# Patient Record
Sex: MALE | Race: WHITE | NOT HISPANIC OR LATINO | Employment: OTHER | URBAN - METROPOLITAN AREA
[De-identification: names, ages, dates, MRNs, and addresses within clinical notes are randomized per-mention and may not be internally consistent; named-entity substitution may affect disease eponyms.]

---

## 2017-01-05 ENCOUNTER — THERAPY VISIT (OUTPATIENT)
Dept: PHYSICAL THERAPY | Facility: CLINIC | Age: 67
End: 2017-01-05

## 2017-01-05 DIAGNOSIS — M25.511 RIGHT SHOULDER PAIN, UNSPECIFIED CHRONICITY: Primary | ICD-10-CM

## 2017-01-05 NOTE — PROGRESS NOTES
Subjective:    HPI                    Objective:    System    Physical Exam    General     ROS    Assessment/Plan:      PROGRESS  REPORT    Progress reporting period is from 12/8/16 to 1/5/17.       SUBJECTIVE  Subjective changes noted by patient:  Pt reports he feels his shoulder continues to improve but it still hurts when he lays on it at night. Still can't sleep on it. After he does his exercises in the morning, then it's OK but if he wears his bibs in the shop, he's sore at the end of the day.  At this point in the visit, I told the patient I wanted to measure his motion and check his strength and he got really upset and left. I did try talking to the patient but he did not want to discuss and left so no measurements or treatment were done.  Current Pain level: 0/10 (right now, 3/10).     Previous pain level was  7/10  .   Changes in function:  Yes (See Goal flowsheet attached for changes in current functional level)  Adverse reaction to treatment or activity: None    OBJECTIVE  Changes noted in objective findings:  Yes, unknown as patient left before measurements could be taken        ASSESSMENT/PLAN  Updated problem list and treatment plan: Diagnosis 1:  Shoulder pain  Pain -  hot/cold therapy, manual therapy, self management, education and home program  Decreased ROM/flexibility - manual therapy, therapeutic exercise, therapeutic activity and home program  Decreased strength - therapeutic exercise, therapeutic activities and home program  Impaired muscle performance - neuro re-education and home program  Decreased function - therapeutic activities and home program  STG/LTGs have been met or progress has been made towards goals:  Yes (See Goal flow sheet completed today.)  Assessment of Progress: Appears patient is improving  Self Management Plans:  Patient has been instructed in a home treatment program.  Patient  has been instructed in self management of symptoms.    John continues to require the  following intervention to meet STG and LTG's:  PT intervention is no longer required to meet STG/LTG.    Recommendations:  D/C as patient left clinic for unknown reasons.    Please refer to the daily flowsheet for treatment today, total treatment time and time spent performing 1:1 timed codes.

## 2017-02-04 ENCOUNTER — HOSPITAL ENCOUNTER (EMERGENCY)
Facility: CLINIC | Age: 67
Discharge: HOME | End: 2017-02-04
Attending: EMERGENCY MEDICINE | Admitting: EMERGENCY MEDICINE
Payer: COMMERCIAL

## 2017-02-04 VITALS
DIASTOLIC BLOOD PRESSURE: 98 MMHG | TEMPERATURE: 98 F | OXYGEN SATURATION: 98 % | SYSTOLIC BLOOD PRESSURE: 161 MMHG | BODY MASS INDEX: 28.12 KG/M2 | HEART RATE: 91 BPM | RESPIRATION RATE: 16 BRPM | WEIGHT: 225 LBS

## 2017-02-04 DIAGNOSIS — S61.411A LACERATION OF RIGHT HAND, INITIAL ENCOUNTER: ICD-10-CM

## 2017-02-04 PROCEDURE — 12004 RPR S/N/AX/GEN/TRK7.6-12.5CM: CPT

## 2017-02-04 PROCEDURE — 99283 EMERGENCY DEPT VISIT LOW MDM: CPT | Mod: 25 | Performed by: EMERGENCY MEDICINE

## 2017-02-04 PROCEDURE — 12004 RPR S/N/AX/GEN/TRK7.6-12.5CM: CPT | Performed by: EMERGENCY MEDICINE

## 2017-02-04 PROCEDURE — 99284 EMERGENCY DEPT VISIT MOD MDM: CPT | Mod: 25

## 2017-02-04 RX ORDER — LIDOCAINE HYDROCHLORIDE AND EPINEPHRINE 10; 10 MG/ML; UG/ML
INJECTION, SOLUTION INFILTRATION; PERINEURAL
Status: DISCONTINUED
Start: 2017-02-04 | End: 2017-02-04 | Stop reason: HOSPADM

## 2017-02-04 RX ORDER — HYDROCODONE BITARTRATE AND ACETAMINOPHEN 5; 325 MG/1; MG/1
1-2 TABLET ORAL EVERY 4 HOURS PRN
Qty: 15 TABLET | Refills: 0 | Status: SHIPPED | OUTPATIENT
Start: 2017-02-04 | End: 2017-02-13

## 2017-02-04 ASSESSMENT — ENCOUNTER SYMPTOMS: WOUND: 1

## 2017-02-04 NOTE — ED PROVIDER NOTES
History     Chief Complaint   Patient presents with     Laceration     to left hand     HPI  John Younger is a 67 year old male who presents with a laceration to the dorsum of his right hand. Just prior to arrival the patient was using a large grinding wheel on a small . The patient reports the grinding wheel shattered and subsequently cut the top of his hand. The bleeding is currently controlled. His tetanus is up to date. There is no additional history to report.  Patient is on aspirin otherwise no antiplatelet therapy.    Past Medical History   Diagnosis Date     Major depressive disorder, single episode, moderate (H) 2005     says he has problems since 12 yrs back.     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     Contact dermatitis and other eczema, due to unspecified cause 2005     Patient Active Problem List   Diagnosis     Hyperlipidemia LDL goal <130     Elevated hemoglobin A1c     Irregular heart beat     Overweight (BMI 25.0-29.9)     24 hour handout given     Generalized anxiety disorder     Right shoulder pain, unspecified chronicity     Current Facility-Administered Medications   Medication     lidocaine 1% with EPINEPHrine 1:100,000 1 %-1:460493 injection     Current Outpatient Prescriptions   Medication     HYDROcodone-acetaminophen (NORCO) 5-325 MG per tablet     QUEtiapine (SEROQUEL) 100 MG tablet     venlafaxine (EFFEXOR-XR) 37.5 MG 24 hr capsule     atorvastatin (LIPITOR) 40 MG tablet     Cholecalciferol (VITAMIN D3) 2000 UNITS CAPS     MULTIVITAMINS OR      No Known Allergies  Social History     Social History     Marital Status:      Spouse Name: N/A     Number of Children: N/A     Years of Education: N/A     Occupational History     Not on file.     Social History Main Topics     Smoking status: Former Smoker     Quit date: 07/15/1983      Smokeless tobacco: Not on file      Comment: started smoking at age 13 got up to 3 packs a day before quiting     Alcohol Use: Yes      Comment: maybe 6 beers a month at the most     Drug Use: No     Sexual Activity:     Partners: Female     Other Topics Concern     Not on file     Social History Narrative     Family History   Problem Relation Age of Onset     CANCER Mother      uterus     Hypertension Mother      Alzheimer Disease Father      Hypertension Sister      Hypertension Sister      Gynecology Sister      hysterectomy     Gynecology Sister      hysterectomy     Depression Brother      Hypertension Brother      C.A.D. Father      bypass surgery, dx at age 60       I have reviewed the Medications, Allergies, Past Medical and Surgical History, and Social History in the Epic system.    Review of Systems   Skin: Positive for wound (laceration to dorsum of right hand).    all other systems are reviewed and are negative.    Physical Exam   BP: (!) 161/98 mmHg  Pulse: 91  Temp: 98  F (36.7  C)  Resp: 16  Weight: 102.059 kg (225 lb)  SpO2: 98 %  Physical Exam Gen. alert cooperative male in moderate stress.  Examination of his right dominant hand reveals a 8.0 cm skin flap on the dorsum.  It's just proximal to the MP joints of the 3rd and 4th digits.  It is a full thickness skin flap.  There is no tendon involvement and the joints are not involved.  Patient also had a superficial skin tear at the MP of the index finger which did not involve the joint.  Also another smaller superficial lesion at the MP of the middle finger.  It's also does not involve the joint.  CMS was intact distally.  No other lesions are noted.    ED Course   Procedures       patient had a field block of lidocaine with epinephrine for anesthetic.  It was then irrigated.  It was explored no foreign body was noted.  Wound is then closed with 15 4-0 Ethilon sutures in interrupted fashion.  Antibiotic and compressive dressing were applied.        Critical Care time:  none               Labs Ordered and Resulted from Time of ED Arrival Up to the Time of Departure from the ED - No data to display    No results found for this or any previous visit (from the past 24 hour(s)).    Medications   lidocaine 1% with EPINEPHrine 1:100,000 1 %-1:944812 injection (not administered)       12:38 PM Patient Assessed       Assessments & Plan (with Medical Decision Making)   67-year-old male presents with laceration to his right dominant hand.  He has a large full-thickness skin flap on the dorsum of his right hand.  Smaller superficial lacerations that did not require suturing on the index and middle finger.  No involvement of the tendon or joints.  CMS is intact distally.  Injury was repaired as above.  No foreign body was noted.  Information laceration care is provided.  Reasons to return for reassessment were discussed.  I have reviewed the nursing notes.    I have reviewed the findings, diagnosis, plan and need for follow up with the patient.    New Prescriptions    HYDROCODONE-ACETAMINOPHEN (NORCO) 5-325 MG PER TABLET    Take 1-2 tablets by mouth every 4 hours as needed for moderate to severe pain       Final diagnoses:   Laceration of right hand, initial encounter     This document serves as a record of the services and decisions personally performed and made by Jourdan Prakash MD. It was created on HIS/HER behalf by Johny Lane, a trained medical scribe. The creation of this document is based the provider's statements to the medical scribe.  Johny Lane 12:39 PM 2/4/2017    Provider:   The information in this document, created by the medical scribe for me, accurately reflects the services I personally performed and the decisions made by me. I have reviewed and approved this document for accuracy prior to leaving the patient care area.  Jourdan Praksah MD 12:39 PM 2/4/2017 2/4/2017   [facility] EMERGENCY DEPARTMENT      Jourdan Prakash MD  02/04/17 [id number]

## 2017-02-04 NOTE — DISCHARGE INSTRUCTIONS
Hand Laceration: All Closures  A laceration is a cut through the skin. You have a cut on the hand. Deep cuts usually require stitches (sutures) or staples. Minor cuts may be closed with surgical tape or skin adhesive.   X-rays may be done if something may have entered the skin through the cut. Your may also be given a tetanus shot. This may be given if you are not updated on this vaccination and the object that cut you may carry tetanus.    Home care    Your healthcare provider may prescribe an antibiotic. This is to help prevent infection. Follow all instructions for taking this medicine. Take the medicine every day until it is gone or you are told to stop. You should not have any left over.    The healthcare provider may prescribe medicines for pain. Follow instructions for taking them.    Follow the healthcare provider s instructions on how to care for the cut.    Keep the wound clean and dry. Do not get the wound wet until you are told it is okay to do so. If the bandage gets wet, remove it. Gently pat the wound dry with a clean cloth. Then put on a clean, dry bandage..    To help prevent infection, wash your hands with soap and water before and after caring for the wound.     Caring for sutures or staples: Once you no longer need to keep them dry, clean the wound daily. First, remove the bandage. Then wash the area gently with soap and warm water, or as directed by the health care provider. Use a wet cotton swab to loosen and remove any blood or crust that forms. After cleaning, apply a thin layer of antibiotic ointment if advised. Then put on a new bandage unless you are told not to.    Caring for skin glue: Don t put apply liquid, ointment, or cream on the wound while the glue is in place. Avoid activities that cause heavy sweating. Protect the wound from sunlight. Do not scratch, rub, or pick at the adhesive film. Do not place tape directly over the film. The glue should peel off within 5 to 10  days.     Caring for surgical tape: Keep the area dry. If it gets wet, blot it dry with a clean towel. Surgical tape usually falls off within 7 to 10 days. If it has not fallen off after 10 days, you can take it off yourself. Put mineral oil or petroleum jelly on a cotton ball and gently rub the tape until it is removed.    Once you can get the wound wet, you may shower as usual but do not soak the wound in water (no tub baths or swimming)    Even with proper treatment, a wound infection may sometimes occur. Check the wound daily for signs of infection listed below.  Follow-up care  Follow up with your healthcare provider as advised. If you have stitches or staples, be sure to return as directed to have them removed.  When to seek medical advice  Call your healthcare provider right away if any of these occur:    Wound bleeding not controlled by direct pressure    Signs of infection, including increasing pain in the wound, increasing wound redness or swelling, or pus or bad odor coming from the wound    Fever of 100.4 F (38. C) or as directed by your health care provider    Stitches or staples come apart or fall out or surgical tape falls off before 7 days    Wound edges re-open    Wound changes colors    Numbness or weakness in the affected hand     Decreased movement of the hand    8656-8388 [employer]. [address] [place] [address], [place] [address]. All rights reserved. This information is not intended as a substitute for professional medical care. Always follow your healthcare professional's instructions.

## 2017-02-04 NOTE — ED AVS SNAPSHOT
[facility] Emergency Department    [address] [place] [address]    Phone:  [phone]    Fax:  [phone]                                       John Younger   MRN: [id number]    Department:  [facility] Emergency Department   Date of Visit:  2/4/2017           Patient Information     Date Of Birth          1950        Your diagnoses for this visit were:     Laceration of right hand, initial encounter        You were seen by Jourdan Prakash MD.      Follow-up Information     Follow up with Olesya Krishnan MD.    Specialty:  Family Practice    Why:  For suture removal in 10 days    Contact information:    [facility]  [id number]   [place] [address]  [phone]          Discharge Instructions         Hand Laceration: All Closures  A laceration is a cut through the skin. You have a cut on the hand. Deep cuts usually require stitches (sutures) or staples. Minor cuts may be closed with surgical tape or skin adhesive.   X-rays may be done if something may have entered the skin through the cut. Your may also be given a tetanus shot. This may be given if you are not updated on this vaccination and the object that cut you may carry tetanus.    Home care    Your healthcare provider may prescribe an antibiotic. This is to help prevent infection. Follow all instructions for taking this medicine. Take the medicine every day until it is gone or you are told to stop. You should not have any left over.    The healthcare provider may prescribe medicines for pain. Follow instructions for taking them.    Follow the healthcare provider s instructions on how to care for the cut.    Keep the wound clean and dry. Do not get the wound wet until you are told it is okay to do so. If the bandage gets wet, remove it. Gently pat the wound dry with a clean cloth. Then put on a clean, dry bandage..    To help prevent infection, wash your hands with soap and water before and after caring for  the wound.     Caring for sutures or staples: Once you no longer need to keep them dry, clean the wound daily. First, remove the bandage. Then wash the area gently with soap and warm water, or as directed by the health care provider. Use a wet cotton swab to loosen and remove any blood or crust that forms. After cleaning, apply a thin layer of antibiotic ointment if advised. Then put on a new bandage unless you are told not to.    Caring for skin glue: Don t put apply liquid, ointment, or cream on the wound while the glue is in place. Avoid activities that cause heavy sweating. Protect the wound from sunlight. Do not scratch, rub, or pick at the adhesive film. Do not place tape directly over the film. The glue should peel off within 5 to 10 days.     Caring for surgical tape: Keep the area dry. If it gets wet, blot it dry with a clean towel. Surgical tape usually falls off within 7 to 10 days. If it has not fallen off after 10 days, you can take it off yourself. Put mineral oil or petroleum jelly on a cotton ball and gently rub the tape until it is removed.    Once you can get the wound wet, you may shower as usual but do not soak the wound in water (no tub baths or swimming)    Even with proper treatment, a wound infection may sometimes occur. Check the wound daily for signs of infection listed below.  Follow-up care  Follow up with your healthcare provider as advised. If you have stitches or staples, be sure to return as directed to have them removed.  When to seek medical advice  Call your healthcare provider right away if any of these occur:    Wound bleeding not controlled by direct pressure    Signs of infection, including increasing pain in the wound, increasing wound redness or swelling, or pus or bad odor coming from the wound    Fever of 100.4 F (38. C) or as directed by your health care provider    Stitches or staples come apart or fall out or surgical tape falls off before 7 days    Wound edges  re-open    Wound changes colors    Numbness or weakness in the affected hand     Decreased movement of the hand    4655-2011 [employer]. [address] [place] [address], [place] [address]. All rights reserved. This information is not intended as a substitute for professional medical care. Always follow your healthcare professional's instructions.          24 Hour Appointment Hotline       To make an appointment at any [facility], call [phone] ([phone]). If you don't have a family doctor or clinic, we will help you find one. [facility] are conveniently located to serve the needs of you and your family.             Review of your medicines      START taking        Dose / Directions Last dose taken    HYDROcodone-acetaminophen 5-325 MG per tablet   Commonly known as:  NORCO   Dose:  1-2 tablet   Quantity:  15 tablet        Take 1-2 tablets by mouth every 4 hours as needed for moderate to severe pain   Refills:  0          Our records show that you are taking the medicines listed below. If these are incorrect, please call your family doctor or clinic.        Dose / Directions Last dose taken    atorvastatin 40 MG tablet   Commonly known as:  LIPITOR   Dose:  40 mg   Quantity:  90 tablet        Take 1 tablet (40 mg) by mouth daily For cholesterol.   Refills:  3        MULTIVITAMINS PO        1 po qd   Refills:  0        QUEtiapine 100 MG tablet   Commonly known as:  SEROQUEL   Quantity:  90 tablet        1 tablet nightly.   Refills:  1        venlafaxine 37.5 MG 24 hr capsule   Commonly known as:  EFFEXOR-XR   Dose:  37.5 mg   Quantity:  90 capsule        Take 1 capsule (37.5 mg) by mouth daily   Refills:  1        vitamin D3 2000 UNITS Caps        Take  by mouth daily.   Refills:  0                Prescriptions were sent or printed at these locations (1 Prescription)                   Other Prescriptions                Printed at Department/Unit printer (1 of 1)          "HYDROcodone-acetaminophen (NORCO) 5-325 MG per tablet                Orders Needing Specimen Collection     None      Pending Results     No orders found from 2/3/2017 to 2017.            Pending Culture Results     No orders found from 2/3/2017 to 2017.             Test Results from your hospital stay            Thank you for choosing [facility]       Thank you for choosing [facility] for your care. Our goal is always to provide you with excellent care. Hearing back from our patients is one way we can continue to improve our services. Please take a few minutes to complete the written survey that you may receive in the mail after you visit with us. Thank you!        [employer] Information     [employer]reader lets you send messages to your doctor, view your test results, renew your prescriptions, schedule appointments and more. To sign up, go to [email or web address] . Click on \"Log in\" on the left side of the screen, which will take you to the Welcome page. Then click on \"Sign up Now\" on the right side of the page.     You will be asked to enter the access code listed below, as well as some personal information. Please follow the directions to create your username and password.     Your access code is: [email or web address]  Expires: 2017  1:22 PM     Your access code will  in 90 days. If you need help or a new code, please call your [facility] clinic or [phone].        Care EveryWhere ID     This is your Care EveryWhere ID. This could be used by other organizations to access your [facility] medical records  [id number]        After Visit Summary       This is your record. Keep this with you and show to your community pharmacist(s) and doctor(s) at your next visit.                  "

## 2017-02-04 NOTE — ED AVS SNAPSHOT
[facility] Emergency Department    [address] [address]    Phone:  [phone]    Fax:  [phone]                                       John Younger   MRN: [id number]    Department:  [facility] Emergency Department   Date of Visit:  2/4/2017           After Visit Summary Signature Page     I have received my discharge instructions, and my questions have been answered. I have discussed any challenges I see with this plan with the nurse or doctor.    ..........................................................................................................................................  Patient/Patient Representative Signature      ..........................................................................................................................................  Patient Representative Print Name and Relationship to Patient    ..................................................               ................................................  Date                                            Time    ..........................................................................................................................................  Reviewed by Signature/Title    ...................................................              ..............................................  Date                                                            Time

## 2017-02-13 ENCOUNTER — OFFICE VISIT (OUTPATIENT)
Dept: FAMILY MEDICINE | Facility: CLINIC | Age: 67
End: 2017-02-13
Payer: COMMERCIAL

## 2017-02-13 VITALS
WEIGHT: 225 LBS | TEMPERATURE: 98.7 F | DIASTOLIC BLOOD PRESSURE: 86 MMHG | HEIGHT: 75 IN | HEART RATE: 76 BPM | SYSTOLIC BLOOD PRESSURE: 136 MMHG | BODY MASS INDEX: 27.98 KG/M2

## 2017-02-13 DIAGNOSIS — S61.401D: ICD-10-CM

## 2017-02-13 DIAGNOSIS — S61.411D HAND LACERATION, RIGHT, SUBSEQUENT ENCOUNTER: Primary | ICD-10-CM

## 2017-02-13 DIAGNOSIS — S66.901D: ICD-10-CM

## 2017-02-13 DIAGNOSIS — Z71.89 ADVANCED DIRECTIVES, COUNSELING/DISCUSSION: ICD-10-CM

## 2017-02-13 PROCEDURE — 99213 OFFICE O/P EST LOW 20 MIN: CPT | Performed by: FAMILY MEDICINE

## 2017-02-13 NOTE — PROGRESS NOTES
"  SUBJECTIVE:                                                    John Younger is a 67 year old male who presents to clinic today for the following health issues:      ED/UC Followup:    Facility:  [facility]  Date of visit: 2/4/2017  Reason for visit: Laceration of right hand  Current Status: 15 sutures placed in right hand. There are no signs of infection now but states that he was having some swelling. He was using Peroxide at home on wound. There is some slear drainage.          ROS:  C: NEGATIVE for fever, chills, change in weight  INTEGUMENTARY/SKIN: POSITIVE for laceration on dorsal aspect of right hand.   E/M: NEGATIVE for ear, mouth and throat problems  R: NEGATIVE for significant cough or SOB  CV: NEGATIVE for chest pain, palpitations or peripheral edema    This document serves as a record of the services and decisions personally performed and made by Olesya Krishnan MD. It was created on his behalf by Gio Howard, a trained medical scribe. The creation of this document is based the provider's statements to the medical scribe.  Gio Howard 8:43 AM February 13, 2017      OBJECTIVE:                                                    /86 (BP Location: Left arm, Cuff Size: Adult Large)  Pulse 76  Temp 98.7  F (37.1  C) (Tympanic)  Ht 6' 3\" (1.905 m)  Wt 225 lb (102.1 kg)  BMI 28.12 kg/m2  Body mass index is 28.12 kg/(m^2).     GENERAL: healthy, alert and no distress  EYES: Eyes grossly normal to inspection, conjunctivae and sclerae normal  MS: no gross musculoskeletal defects noted, no edema  SKIN: 4 cm, U-shaped laceration over the dorsum of the right hand. Relatively poor union of the wound edges. CMS intact but inability to completely extend middle fingers.   NEURO: Normal strength and tone, mentation intact and speech normal  PSYCH: mentation appears normal, affect normal/bright           ASSESSMENT/PLAN:                                                      (E43.757V) Hand laceration, right, " subsequent encounter  (primary encounter diagnosis)  Comment: Significant laceration, especially with the grinding blade. I reviewed the edges are not sharp with this type of injury, and the scar tends to be wide. Reviewed the laceration may open up over time.     Plan: Sutures removed, watch for any infection. Call with any concerns or questions.      (K08.401D,  H21.406A) Open wound of hand with tendon injury, right, subsequent encounter  Comment: It does appear to be a slight decrease in full extension involving the third finger, right hand, approximately 10 . Patient was comfortable with the function of his finger did not want to pursue further treatment.  Plan: Call with any concerns or questions.      (J65.26) Advanced directives, counseling/discussion            There are no Patient Instructions on file for this visit.      Patient will follow up if symptoms worsen or do not improve. Patient instructed to call with any questions or concerns.    The information in this document, created by a scribe for me, accurately reflects the services I personally performed and the decisions made by me. I have reviewed and approved this document for accuracy. 8:44 AM2/13/2017    Olesya Krishnan MD  [facility]

## 2017-02-13 NOTE — PROCEDURES
"  SUBJECTIVE:                                                    John Younger is a 67 year old male who presents to clinic today for the following health issues:      ED/UC Followup:    Facility:  [facility]  Date of visit: 2/4/2017  Reason for visit: laceration on right hand  Current Status: 15 sutures were placed. There are no signs of infection, redness, swelling or fevers.       {additional problems for provider to add:107569}    Problem list and histories reviewed & adjusted, as indicated.  Additional history: {NONE - AS DOCUMENTED:692195::\"as documented\"}    {HIST REVIEW/ LINKS 2:598347}    {PROVIDER CHARTING PREFERENCE:467564}    "

## 2017-02-13 NOTE — MR AVS SNAPSHOT
"              After Visit Summary   2017    John Younger    MRN: [id number]           Patient Information     Date Of Birth          1950        Visit Information        Provider Department      2017 8:20 AM Olesya Krishnan MD [facility]        Today's Diagnoses     Hand laceration, right, subsequent encounter    -  1    Advanced directives, counseling/discussion        Open wound of hand with tendon injury, right, subsequent encounter           Follow-ups after your visit        Who to contact     Normal or non-critical lab and imaging results will be communicated to you by Popps Appshart, letter or phone within 4 business days after the clinic has received the results. If you do not hear from us within 7 days, please contact the clinic through Popps Appshart or phone. If you have a critical or abnormal lab result, we will notify you by phone as soon as possible.  Submit refill requests through Maestrano or call your pharmacy and they will forward the refill request to us. Please allow 3 business days for your refill to be completed.          If you need to speak with a  for additional information , please call: [phone]           Additional Information About Your Visit        MyChart Information     Maestrano lets you send messages to your doctor, view your test results, renew your prescriptions, schedule appointments and more. To sign up, go to [email or web address] . Click on \"Log in\" on the left side of the screen, which will take you to the Welcome page. Then click on \"Sign up Now\" on the right side of the page.     You will be asked to enter the access code listed below, as well as some personal information. Please follow the directions to create your username and password.     Your access code is: [email or web address]  Expires: 2017  1:22 PM     Your access code will  in 90 days. If you need help or a new code, please call your [facility] clinic or [phone].   " "     Care EveryWhere ID     This is your Care EveryWhere ID. This could be used by other organizations to access your [facility] medical records  [id number]        Your Vitals Were     Pulse Temperature Height BMI (Body Mass Index)          76 98.7  F (37.1  C) (Tympanic) 6' 3\" (1.905 m) 28.12 kg/m2         Blood Pressure from Last 3 Encounters:   02/13/17 136/86   02/04/17 (!) 161/98   11/23/16 130/90    Weight from Last 3 Encounters:   02/13/17 225 lb (102.1 kg)   02/04/17 225 lb (102.1 kg)   11/23/16 204 lb (92.5 kg)              Today, you had the following     No orders found for display         Today's Medication Changes          These changes are accurate as of: 2/13/17 11:59 PM.  If you have any questions, ask your nurse or doctor.               Stop taking these medicines if you haven't already. Please contact your care team if you have questions.     HYDROcodone-acetaminophen 5-325 MG per tablet   Commonly known as:  NORCO   Stopped by:  Olesya Krishnan MD                    Primary Care Provider Office Phone # Fax #    Olesya Krishnan [phone]       [facility] [address]   [place] [address]        Thank you!     Thank you for choosing [facility]  for your care. Our goal is always to provide you with excellent care. Hearing back from our patients is one way we can continue to improve our services. Please take a few minutes to complete the written survey that you may receive in the mail after your visit with us. Thank you!             Your Updated Medication List - Protect others around you: Learn how to safely use, store and throw away your medicines at www.disposemymeds.org.          This list is accurate as of: 2/13/17 11:59 PM.  Always use your most recent med list.                   Brand Name Dispense Instructions for use    atorvastatin 40 MG tablet    LIPITOR    90 tablet    Take 1 tablet (40 mg) by mouth daily For cholesterol.       " MULTIVITAMINS PO      1 po qd       QUEtiapine 100 MG tablet    SEROQUEL    90 tablet    1 tablet nightly.       venlafaxine 37.5 MG 24 hr capsule    EFFEXOR-XR    90 capsule    Take 1 capsule (37.5 mg) by mouth daily       vitamin D3 2000 UNITS Caps      Take  by mouth daily.

## 2017-02-13 NOTE — NURSING NOTE
"Chief Complaint   Patient presents with     Suture Removal       Initial /86 (BP Location: Left arm, Cuff Size: Adult Large)  Pulse 76  Temp 98.7  F (37.1  C) (Tympanic)  Ht 6' 3\" (1.905 m)  Wt 225 lb (102.1 kg)  BMI 28.12 kg/m2 Estimated body mass index is 28.12 kg/(m^2) as calculated from the following:    Height as of this encounter: 6' 3\" (1.905 m).    Weight as of this encounter: 225 lb (102.1 kg).  Medication Reconciliation: complete    "

## 2017-03-28 ENCOUNTER — OFFICE VISIT (OUTPATIENT)
Dept: FAMILY MEDICINE | Facility: CLINIC | Age: 67
End: 2017-03-28
Payer: COMMERCIAL

## 2017-03-28 VITALS
SYSTOLIC BLOOD PRESSURE: 126 MMHG | HEIGHT: 75 IN | TEMPERATURE: 97 F | DIASTOLIC BLOOD PRESSURE: 84 MMHG | WEIGHT: 221.25 LBS | BODY MASS INDEX: 27.51 KG/M2 | HEART RATE: 74 BPM

## 2017-03-28 DIAGNOSIS — S61.411D HAND LACERATION, RIGHT, SUBSEQUENT ENCOUNTER: Primary | ICD-10-CM

## 2017-03-28 DIAGNOSIS — L03.113 CELLULITIS OF HAND, RIGHT: ICD-10-CM

## 2017-03-28 PROCEDURE — 99213 OFFICE O/P EST LOW 20 MIN: CPT | Performed by: FAMILY MEDICINE

## 2017-03-28 RX ORDER — CEPHALEXIN 500 MG/1
500 CAPSULE ORAL 3 TIMES DAILY
Qty: 30 CAPSULE | Refills: 0 | Status: SHIPPED | OUTPATIENT
Start: 2017-03-28 | End: 2018-02-02

## 2017-03-28 NOTE — MR AVS SNAPSHOT
After Visit Summary   3/28/2017    John Younger    MRN: [id number]           Patient Information     Date Of Birth          1950        Visit Information        Provider Department      3/28/2017 8:20 AM Olesya Krishnan MD [facility]        Today's Diagnoses     Hand laceration, right, subsequent encounter    -  1    Cellulitis of hand, right          Care Instructions    *   Could be infection, will treat with antibiotics.     *   Something does seem right, see the hand surgeon. Call [phone].         Follow-ups after your visit        Additional Services     ORTHO  REFERRAL       [facility] is referring you to the Orthopedic  Services at [facility].       The  Representative will assist you in the coordination of your Orthopedic and Musculoskeletal Care as prescribed by your physician.    The  Representative will call you within 1 business day to help schedule your appointment, or you may contact the  Representative at:    All areas ~ [phone]     Type of Referral : Surgical / Specialist hand surgeon.         Timeframe requested: 3 - 5 days    Coverage of these services is subject to the terms and limitations of your health insurance plan.  Please call member services at your health plan with any benefit or coverage questions.      If X-rays, CT or MRI's have been performed, please contact the facility where they were done to arrange for , prior to your scheduled appointment.  Please bring this referral request to your appointment and present it to your specialist.                  Who to contact     Normal or non-critical lab and imaging results will be communicated to you by MyChart, letter or phone within 4 business days after the clinic has received the results. If you do not hear from us within 7 days, please contact the clinic through MyChart or phone. If you have a  "critical or abnormal lab result, we will notify you by phone as soon as possible.  Submit refill requests through Qyuki or call your pharmacy and they will forward the refill request to us. Please allow 3 business days for your refill to be completed.          If you need to speak with a  for additional information , please call: [phone]           Additional Information About Your Visit        [employer] Information     Qyuki lets you send messages to your doctor, view your test results, renew your prescriptions, schedule appointments and more. To sign up, go to [email or web address] . Click on \"Log in\" on the left side of the screen, which will take you to the Welcome page. Then click on \"Sign up Now\" on the right side of the page.     You will be asked to enter the access code listed below, as well as some personal information. Please follow the directions to create your username and password.     Your access code is: [email or web address]  Expires: 2017  2:22 PM     Your access code will  in 90 days. If you need help or a new code, please call your [facility] clinic or [phone].        Care EveryWhere ID     This is your Care EveryWhere ID. This could be used by other organizations to access your [facility] medical records  [id number]        Your Vitals Were     Pulse Temperature Height BMI (Body Mass Index)          74 97  F (36.1  C) (Tympanic) 6' 3\" (1.905 m) 27.65 kg/m2         Blood Pressure from Last 3 Encounters:   17 126/84   17 136/86   17 (!) 161/98    Weight from Last 3 Encounters:   17 221 lb 4 oz (100.4 kg)   17 225 lb (102.1 kg)   17 225 lb (102.1 kg)              We Performed the Following     ORTHO  REFERRAL          Today's Medication Changes          These changes are accurate as of: 3/28/17  8:48 AM.  If you have any questions, ask your nurse or doctor.               Start taking these medicines.        Dose/Directions    " cephALEXin 500 MG capsule   Commonly known as:  KEFLEX   Used for:  Hand laceration, right, subsequent encounter   Started by:  Olesya Krishnan MD        Dose:  500 mg   Take 1 capsule (500 mg) by mouth 3 times daily For infection.   Quantity:  30 capsule   Refills:  0            Where to get your medicines      These medications were sent to [place] [address] [facility] - [address], [facility] [address]     Phone:  [phone]     cephALEXin 500 MG capsule                Primary Care Provider Office Phone # Fax #    Olesya Krishnan [phone]       [facility] [address]   [place] [address]        Thank you!     Thank you for choosing [facility]  for your care. Our goal is always to provide you with excellent care. Hearing back from our patients is one way we can continue to improve our services. Please take a few minutes to complete the written survey that you may receive in the mail after your visit with us. Thank you!             Your Updated Medication List - Protect others around you: Learn how to safely use, store and throw away your medicines at www.disposemymeds.org.          This list is accurate as of: 3/28/17  8:48 AM.  Always use your most recent med list.                   Brand Name Dispense Instructions for use    atorvastatin 40 MG tablet    LIPITOR    90 tablet    Take 1 tablet (40 mg) by mouth daily For cholesterol.       cephALEXin 500 MG capsule    KEFLEX    30 capsule    Take 1 capsule (500 mg) by mouth 3 times daily For infection.       MULTIVITAMINS PO      1 po qd       QUEtiapine 100 MG tablet    SEROQUEL    90 tablet    1 tablet nightly.       venlafaxine 37.5 MG 24 hr capsule    EFFEXOR-XR    90 capsule    Take 1 capsule (37.5 mg) by mouth daily       vitamin D3 2000 UNITS Caps      Take  by mouth daily.

## 2017-03-28 NOTE — NURSING NOTE
"Chief Complaint   Patient presents with     RECHECK       Initial /84  Pulse 74  Temp 97  F (36.1  C) (Tympanic)  Ht 6' 3\" (1.905 m)  Wt 221 lb 4 oz (100.4 kg)  BMI 27.65 kg/m2 Estimated body mass index is 27.65 kg/(m^2) as calculated from the following:    Height as of this encounter: 6' 3\" (1.905 m).    Weight as of this encounter: 221 lb 4 oz (100.4 kg).  Medication Reconciliation: complete      "
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
## 2017-03-28 NOTE — PATIENT INSTRUCTIONS
*   Could be infection, will treat with antibiotics.     *   Something does seem right, see the hand surgeon. Call [phone].

## 2017-04-21 ENCOUNTER — TELEPHONE (OUTPATIENT)
Dept: FAMILY MEDICINE | Facility: CLINIC | Age: 67
End: 2017-04-21

## 2017-04-21 NOTE — LETTER
[facility]  [address] [id number] [phone]      April 21, 2017      John Younger  [address] [address]        Dear John,     As part of [facility]'s commitment to health and wellness we have recently reviewed your chart and your medical record indicates that you are due for one or more of the following:    -- Colonoscopy or FIT test. The last colonoscopy/FIT that we have on file for you was from 11/13/14.    Please call one of the following numbers to schedule a colonoscopy:  [facility] [phone]  [facility] [phone]  [facility] [phone]  [facility] [phone] (multiple sites, call for locations)    A colonoscopy is the gold standard but if you are reluctant to do this there is a less invasive and less expensive alternative. FIT (fecal immunochemical testing) is a screening option that is performed on a yearly basis. This is a test to check for blood in the stool, which can be performed in the comfort of your own home. If you are willing to perform this test, we can order the kit for you to  at our lab. When you have completed the test, you simply mail it in the pre-addressed envelope.    Please call us at [phone] if you need an order for a colonoscopy or FIT testing.    Looking through your chart it looks like you are coming due for your next office visit to recheck medications some time in May.    While we work hard to maintain accurate records on all our patients, it is always possible that this notice does not accurately reflect tests that you may have had. To ensure that we do not continue to send you notices please verify, at your next visit or by a MightyQuiz message, that we have accurate dates of your tests, even if these were done many years ago.     Working together for your health is our goal. Thank you for choosing [facility] for your healthcare needs.    Sincerely,     Olesya  MD Eulogio/kp

## 2017-04-21 NOTE — TELEPHONE ENCOUNTER
Panel Management Review      Patient has the following on his problem list: None      Composite cancer screening  Chart review shows that this patient is due/due soon for the following Colonoscopy and Fecal Colorectal (FIT)  Summary:    Patient is due/failing the following:   COLONOSCOPY and FIT    Action needed:   Patient due for colonoscopy or FIT testing. I will let patient know he will be due for appointment to recheck medications in May    Type of outreach:    Sent letter.    Questions for provider review:    None                                                                                                                                    Awa Ayala CMA

## 2017-08-11 DIAGNOSIS — F41.1 GENERALIZED ANXIETY DISORDER: ICD-10-CM

## 2017-08-11 RX ORDER — QUETIAPINE FUMARATE 100 MG/1
TABLET, FILM COATED ORAL
Qty: 90 TABLET | Refills: 1 | Status: SHIPPED | OUTPATIENT
Start: 2017-08-11 | End: 2018-02-02

## 2017-08-11 NOTE — TELEPHONE ENCOUNTER
Quetiapine 100mg     Last Written Prescription Date: 11/23/2016 #90 x 1  Last filled 05/16/2017  Last Office Visit with [facility], [facility] or  Health prescribing provider: 03/28/2017 HOLLAND Krishnan       BP Readings from Last 3 Encounters:   03/28/17 126/84   02/13/17 136/86   02/04/17 (!) 161/98     Pulse Readings from Last 2 Encounters:   03/28/17 74   02/13/17 76     Lab Results   Component Value Date     01/05/2016     Lab Results   Component Value Date    WBC 5.9 01/05/2016     Lab Results   Component Value Date    RBC 4.95 01/05/2016     Lab Results   Component Value Date    HGB 15.1 01/05/2016     Lab Results   Component Value Date    HCT 45.0 01/05/2016     No components found for: MCT  Lab Results   Component Value Date    MCV 91 01/05/2016     Lab Results   Component Value Date    MCH 30.5 01/05/2016     Lab Results   Component Value Date    MCHC 33.6 01/05/2016     Lab Results   Component Value Date    RDW 13.4 01/05/2016     Lab Results   Component Value Date     01/05/2016     Lab Results   Component Value Date    CHOL 170 01/05/2016     Lab Results   Component Value Date    HDL 43 01/05/2016     Lab Results   Component Value Date     01/05/2016     Lab Results   Component Value Date    TRIG 116 01/05/2016     Lab Results   Component Value Date    CHOLHDLRATIO 4.4 01/08/2015

## 2018-01-31 NOTE — PROGRESS NOTES
"  SUBJECTIVE:   John Younger is a 68 year old male who presents to clinic today for the following health issues:      Hyperlipidemia Follow-Up  Atorvastatin 40mg qd    Rate your low fat/cholesterol diet?: good    Taking statin?  Yes, no muscle aches from statin    Other lipid medications/supplements?:  none    Anxiety Follow-Up  Seroquel 100mg qd    Status since last visit: Slight increase secondary to conflicts with neighbors, but overall tolerable. He discontinued Effexor because he did not think it was effective.    Other associated symptoms:None    Complicating factors:   Significant life event: No  Current substance abuse: None  Depression symptoms: No change    LEOLA-7 SCORE 1/5/2016 2/5/2016 11/23/2016   Total Score - - -   Total Score 2 2 0           Problem list, Medication list, Allergies, and Medical/Social/Surgical/Family histories reviewed in [facility] and updated as appropriate.    Labs reviewed in [facility]      ROS:  Constitutional, HEENT, cardiovascular, pulmonary, gi and gu systems are negative, except as otherwise noted.      This document serves as a record of the services and decisions personally performed and made by Olesya Krishnan MD. It was created on his behalf by Ami Liang, a trained medical scribe. The creation of this document is based the provider's statements to the medical scribe.  Ami Liang 9:17 AM February 2, 2018  OBJECTIVE:     /70  Pulse 64  Ht 6' 3\" (1.905 m)  Wt 221 lb (100.2 kg)  BMI 27.62 kg/m2  Body mass index is 27.62 kg/(m^2).     GENERAL: Healthy, alert and no distress  RESP: Lungs clear to auscultation - no rales, rhonchi or wheezes  CV: Regular rate and rhythm, normal S1 S2, no murmur  MS: No gross musculoskeletal defects noted, no edema  NEURO: Normal strength and tone, mentation intact and speech normal  PSYCH: Mentation appears normal, affect normal    Labs ordered today are pending    ASSESSMENT/PLAN:     (E78.5) Hyperlipidemia LDL goal <130  (primary encounter " diagnosis)  Comment: Primary prevention. Tolerating moderate statin well. Chronic stable problem.  Plan: Lipid panel reflex to direct LDL Fasting,         atorvastatin (LIPITOR) 40 MG tablet - Refilled.    (R73.09) Elevated glucose  Comment: Will check A1c today.  Plan: Hemoglobin A1c    (F41.1) Generalized anxiety disorder  Comment: Slight increase secondary to conflict with neighbors, but overall tolerable. He discontinued Effexor because he did not find it effective.  Plan: QUEtiapine (SEROQUEL) 100 MG tablet,         DISCONTINUED: venlafaxine (EFFEXOR-XR) 37.5 MG         24 hr capsule - Continue Seroquel as prescribed.        Patient will follow up in 6 months or sooner, PRN. Patient instructed to call with any questions or concerns.     total time spent with pt. was 25 minutes, 20 minutes of the visit was spent discussing the above issues, including pathophysiology, treatment options, and expected outcomes.     Patient Instructions   *   Sounds stressful.     *   Continue on the sleeping pill and the cholesterol medication.       The information in this document, created by a scribe for me, accurately reflects the services I personally performed and the decisions made by me. I have reviewed and approved this document for accuracy.  9:19 AM February 2, 2018    Olesya Krishnan MD  [facility]

## 2018-02-02 ENCOUNTER — OFFICE VISIT (OUTPATIENT)
Dept: FAMILY MEDICINE | Facility: CLINIC | Age: 68
End: 2018-02-02
Payer: COMMERCIAL

## 2018-02-02 VITALS
HEART RATE: 64 BPM | SYSTOLIC BLOOD PRESSURE: 124 MMHG | WEIGHT: 221 LBS | HEIGHT: 75 IN | DIASTOLIC BLOOD PRESSURE: 70 MMHG | BODY MASS INDEX: 27.48 KG/M2

## 2018-02-02 DIAGNOSIS — E78.5 HYPERLIPIDEMIA LDL GOAL <130: Primary | ICD-10-CM

## 2018-02-02 DIAGNOSIS — R73.09 ELEVATED GLUCOSE: ICD-10-CM

## 2018-02-02 DIAGNOSIS — F41.1 GENERALIZED ANXIETY DISORDER: ICD-10-CM

## 2018-02-02 PROCEDURE — 99214 OFFICE O/P EST MOD 30 MIN: CPT | Performed by: FAMILY MEDICINE

## 2018-02-02 RX ORDER — QUETIAPINE FUMARATE 100 MG/1
TABLET, FILM COATED ORAL
Qty: 90 TABLET | Refills: 1 | Status: SHIPPED | OUTPATIENT
Start: 2018-02-02 | End: 2018-08-01

## 2018-02-02 RX ORDER — VENLAFAXINE HYDROCHLORIDE 37.5 MG/1
37.5 CAPSULE, EXTENDED RELEASE ORAL DAILY
Qty: 90 CAPSULE | Refills: 1 | Status: SHIPPED | OUTPATIENT
Start: 2018-02-02 | End: 2018-02-02

## 2018-02-02 RX ORDER — ATORVASTATIN CALCIUM 40 MG/1
40 TABLET, FILM COATED ORAL DAILY
Qty: 90 TABLET | Refills: 3 | Status: SHIPPED | OUTPATIENT
Start: 2018-02-02 | End: 2019-08-09

## 2018-02-02 NOTE — NURSING NOTE
"Chief Complaint   Patient presents with     Anxiety       Initial /70  Pulse 64  Ht 6' 3\" (1.905 m)  Wt 221 lb (100.2 kg)  BMI 27.62 kg/m2 Estimated body mass index is 27.62 kg/(m^2) as calculated from the following:    Height as of this encounter: 6' 3\" (1.905 m).    Weight as of this encounter: 221 lb (100.2 kg).  Medication Reconciliation: complete  "

## 2018-02-02 NOTE — MR AVS SNAPSHOT
After Visit Summary   2/2/2018    John Younger    MRN: [id number]           Patient Information     Date Of Birth          1950        Visit Information        Provider Department      2/2/2018 9:00 AM Olesya Krishnan MD [facility]        Today's Diagnoses     Hyperlipidemia LDL goal <130    -  1    Elevated glucose        Generalized anxiety disorder          Care Instructions    *   Sounds stressful.     *   Continue on the sleeping pill and the cholesterol medication.           Follow-ups after your visit        Your next 10 appointments already scheduled     Aug 03, 2018  7:40 AM CDT   Office Visit with Olesya Krishnan MD   [facility] ([facility])    [address] [address]   [phone]           Bring a current list of meds and any records pertaining to this visit. For Physicals, please bring immunization records and any forms needing to be filled out. Please arrive 10 minutes early to complete paperwork.              Future tests that were ordered for you today     Open Future Orders        Priority Expected Expires Ordered    Lipid panel reflex to direct LDL Fasting Routine  2/1/2019 2/2/2018    Hemoglobin A1c Routine  2/1/2019 2/2/2018            Who to contact     Normal or non-critical lab and imaging results will be communicated to you by MyChart, letter or phone within 4 business days after the clinic has received the results. If you do not hear from us within 7 days, please contact the clinic through MyChart or phone. If you have a critical or abnormal lab result, we will notify you by phone as soon as possible.  Submit refill requests through G5 or call your pharmacy and they will forward the refill request to us. Please allow 3 business days for your refill to be completed.          If you need to speak with a  for additional information , please call: [phone]            "Additional Information About Your Visit        MyChart Information     citysocializer lets you send messages to your doctor, view your test results, renew your prescriptions, schedule appointments and more. To sign up, go to [email or web address] . Click on \"Log in\" on the left side of the screen, which will take you to the Welcome page. Then click on \"Sign up Now\" on the right side of the page.     You will be asked to enter the access code listed below, as well as some personal information. Please follow the directions to create your username and password.     Your access code is: [email or web address]  Expires: 2018 10:27 AM     Your access code will  in 90 days. If you need help or a new code, please call your [place] clinic or [phone].        Care EveryWhere ID     This is your Care EveryWhere ID. This could be used by other organizations to access your [place] medical records  [id number]        Your Vitals Were     Pulse Height BMI (Body Mass Index)             64 6' 3\" (1.905 m) 27.62 kg/m2          Blood Pressure from Last 3 Encounters:   18 124/70   17 126/84   17 136/86    Weight from Last 3 Encounters:   18 221 lb (100.2 kg)   17 221 lb 4 oz (100.4 kg)   17 225 lb (102.1 kg)                 Today's Medication Changes          These changes are accurate as of 18 11:59 PM.  If you have any questions, ask your nurse or doctor.               These medicines have changed or have updated prescriptions.        Dose/Directions    QUEtiapine 100 MG tablet   Commonly known as:  SEROquel   This may have changed:  See the new instructions.   Used for:  Generalized anxiety disorder        TAKE 1 TABLET BY MOUTH NIGHTLY   Quantity:  90 tablet   Refills:  1         Stop taking these medicines if you haven't already. Please contact your care team if you have questions.     cephALEXin 500 MG capsule   Commonly known as:  KEFLEX           venlafaxine 37.5 MG 24 hr capsule "   Commonly known as:  EFFEXOR-XR                Where to get your medicines      These medications were sent to [facility] [address] [facility] - [place], [place] - [facility] [address] [address] [address] [facility], [facility] [address]     Phone:  [phone]     atorvastatin 40 MG tablet    QUEtiapine 100 MG tablet                Primary Care Provider Office Phone # Fax #    Olesya Krishnan [phone] [address] [place] [address]        Equal Access to Services     ISAIAS BOB AH: Hadii aad ku hadasho Soomaali, waaxda luqadaha, qaybta kaalmada adeegyada, waxay idiin hayaan adeeg kharash la'aan ah. So [facility] [phone].    ATENCIÓN: Si habla español, tiene a corbett disposición servicios gratuitos de asistencia lingüística. [facility] [phone].    We comply with applicable federal civil rights laws and [place] laws. We do not discriminate on the basis of race, color, national origin, age, disability, sex, sexual orientation, or gender identity.            Thank you!     Thank you for choosing [facility]  for your care. Our goal is always to provide you with excellent care. Hearing back from our patients is one way we can continue to improve our services. Please take a few minutes to complete the written survey that you may receive in the mail after your visit with us. Thank you!             Your Updated Medication List - Protect others around you: Learn how to safely use, store and throw away your medicines at www.disposemymeds.org.          This list is accurate as of 2/2/18 11:59 PM.  Always use your most recent med list.                   Brand Name Dispense Instructions for use Diagnosis    atorvastatin 40 MG tablet    LIPITOR    90 tablet    Take 1 tablet (40 mg) by mouth daily For cholesterol.    Hyperlipidemia LDL goal <130       MULTIVITAMINS PO      1 po qd        QUEtiapine 100 MG tablet    SEROquel    90 tablet    TAKE 1 TABLET BY MOUTH NIGHTLY    Generalized anxiety disorder        vitamin D3 2000 UNITS Caps      Take  by mouth daily.

## 2018-03-20 ENCOUNTER — DOCUMENTATION ONLY (OUTPATIENT)
Dept: VASCULAR SURGERY | Facility: CLINIC | Age: 68
End: 2018-03-20

## 2018-03-20 DIAGNOSIS — Z13.6 SCREENING FOR AAA (ABDOMINAL AORTIC ANEURYSM): ICD-10-CM

## 2018-03-20 DIAGNOSIS — Z87.891 HISTORY OF SMOKING: ICD-10-CM

## 2018-04-02 ENCOUNTER — TELEPHONE (OUTPATIENT)
Dept: FAMILY MEDICINE | Facility: CLINIC | Age: 68
End: 2018-04-02

## 2018-04-02 NOTE — TELEPHONE ENCOUNTER
Panel Management Review      Patient has the following on his problem list:       IVD   ASA: FAILED    Last LDL:    Lab Results   Component Value Date    CHOL 170 01/05/2016     Lab Results   Component Value Date    HDL 43 01/05/2016     Lab Results   Component Value Date     01/05/2016     Lab Results   Component Value Date    TRIG 116 01/05/2016        Lab Results   Component Value Date    CHOLHDLRATIO 4.4 01/08/2015        Is the patient on a Statin? YES   Is the patient on Aspirin? NO                  Medications     HMG CoA Reductase Inhibitors    atorvastatin (LIPITOR) 40 MG tablet          Last three blood pressure readings:  BP Readings from Last 3 Encounters:   02/02/18 124/70   03/28/17 126/84   02/13/17 136/86        Tobacco History:     History   Smoking Status     Former Smoker     Quit date: 7/15/1983   Smokeless Tobacco     Not on file     Comment: started smoking at age 13 got up to 3 packs a day before quiting         Composite cancer screening  Chart review shows that this patient is due/due soon for the following Colonoscopy and Fecal Colorectal (FIT)  Summary:    Patient is due/failing the following:   ASA, colonoscopy/FIT test    Action needed:   Patient needs referral/order: colonoscopy/FIT testing    Type of outreach:    Sent letter.    Questions for provider review:    None                                                                                                                                    Awa Ayala CMA

## 2018-08-01 DIAGNOSIS — F41.1 GENERALIZED ANXIETY DISORDER: ICD-10-CM

## 2018-08-01 RX ORDER — QUETIAPINE FUMARATE 100 MG/1
TABLET, FILM COATED ORAL
Qty: 90 TABLET | Refills: 1 | Status: SHIPPED | OUTPATIENT
Start: 2018-08-01 | End: 2019-01-24

## 2018-08-01 NOTE — TELEPHONE ENCOUNTER
Requested Prescriptions   Pending Prescriptions Disp Refills     QUEtiapine (SEROQUEL) 100 MG tablet [Pharmacy Med Name: QUETIAPINE FUMARATE 100 MG TAB] 90 tablet 1    Last Written Prescription Date:  2/2/18  Last Fill Quantity: 90,  # refills: 1   Last office visit: 2/2/2018 with prescribing provider:  2/2/18 kevin   Future Office Visit:   Next 5 appointments (look out 90 days)     Aug 03, 2018  7:40 AM CDT   Office Visit with Olesya Krishnan MD   [facility]    [address] [address] [phone]                  Sig: TAKE 1 TABLET BY MOUTH NIGHTLY    Antipsychotic Medications Failed    8/1/2018  2:01 AM       Failed - Lipid panel on file within the past 12 months    Recent Labs   Lab Test  01/05/16   0800  01/08/15   0759   CHOL  170  168   TRIG  116  138   HDL  43  38*   LDL  104*  102   NHDL  127   --    VLDL   --   28   CHOLHDLRATIO   --   4.4              Failed - CBC on file in past 12 months    Recent Labs   Lab Test  01/05/16   0800   WBC  5.9   RBC  4.95   HGB  15.1   HCT  45.0   PLT  153       For [facility] ONLY: [id number] = WBC, CDCZ011 = RBC         Failed - A1c or Glucose on file in past 12 months    Recent Labs   Lab Test  01/05/16   0800  01/05/16   0759   GLC  121*   --    A1C   --   6.1*       Please review patients last 3 weights. If a weight gain of >10 lbs exists, you may refill the prescription once after instructing the patient to schedule an appointment within the next 30 days.    Wt Readings from Last 3 Encounters:   02/02/18 221 lb (100.2 kg)   03/28/17 221 lb 4 oz (100.4 kg)   02/13/17 225 lb (102.1 kg)            Passed - Blood pressure under 140/90 in past 12 months    BP Readings from Last 3 Encounters:   02/02/18 124/70   03/28/17 126/84   02/13/17 136/86                Passed - Patient is 12 years of age or older       Passed - Heart Rate on file within past 12 months    Pulse Readings from Last 3 Encounters:  "  02/02/18 64   03/28/17 74   02/13/17 76              Passed - Recent (6 mo) or future (30 days) visit within the authorizing provider's specialty    Patient had office visit in the last 6 months or has a visit in the next 30 days with authorizing provider or within the authorizing provider's specialty.  See \"Patient Info\" tab in inbasket, or \"Choose Columns\" in Meds & Orders section of the refill encounter.              "

## 2018-08-01 NOTE — TELEPHONE ENCOUNTER
Routing refill request to provider for review/approval because:  Drug not on the [facility] refill protocol or controlled substance  PAngella Tsang  Clinic  RN/Adam Ambrose

## 2018-08-03 ENCOUNTER — OFFICE VISIT (OUTPATIENT)
Dept: FAMILY MEDICINE | Facility: CLINIC | Age: 68
End: 2018-08-03
Payer: COMMERCIAL

## 2018-08-03 VITALS
SYSTOLIC BLOOD PRESSURE: 124 MMHG | WEIGHT: 210.25 LBS | DIASTOLIC BLOOD PRESSURE: 84 MMHG | HEIGHT: 75 IN | HEART RATE: 68 BPM | BODY MASS INDEX: 26.14 KG/M2

## 2018-08-03 DIAGNOSIS — F41.1 GENERALIZED ANXIETY DISORDER: Primary | ICD-10-CM

## 2018-08-03 DIAGNOSIS — Z12.11 COLON CANCER SCREENING: ICD-10-CM

## 2018-08-03 DIAGNOSIS — Z13.6 SCREENING FOR AAA (ABDOMINAL AORTIC ANEURYSM): ICD-10-CM

## 2018-08-03 DIAGNOSIS — Z87.891 HISTORY OF SMOKING: ICD-10-CM

## 2018-08-03 DIAGNOSIS — E78.5 HYPERLIPIDEMIA LDL GOAL <130: ICD-10-CM

## 2018-08-03 DIAGNOSIS — R73.09 ELEVATED HEMOGLOBIN A1C: ICD-10-CM

## 2018-08-03 DIAGNOSIS — Z79.1 NSAID LONG-TERM USE: ICD-10-CM

## 2018-08-03 LAB
ANION GAP SERPL CALCULATED.3IONS-SCNC: 4 MMOL/L (ref 3–14)
BUN SERPL-MCNC: 18 MG/DL (ref 7–30)
CALCIUM SERPL-MCNC: 8.5 MG/DL (ref 8.5–10.1)
CHLORIDE SERPL-SCNC: 105 MMOL/L (ref 94–109)
CHOLEST SERPL-MCNC: 231 MG/DL
CO2 SERPL-SCNC: 28 MMOL/L (ref 20–32)
CREAT SERPL-MCNC: 0.95 MG/DL (ref 0.66–1.25)
GFR SERPL CREATININE-BSD FRML MDRD: 78 ML/MIN/1.7M2
GLUCOSE SERPL-MCNC: 116 MG/DL (ref 70–99)
HBA1C MFR BLD: 6.2 % (ref 0–5.6)
HDLC SERPL-MCNC: 40 MG/DL
LDLC SERPL CALC-MCNC: 175 MG/DL
NONHDLC SERPL-MCNC: 191 MG/DL
POTASSIUM SERPL-SCNC: 4.5 MMOL/L (ref 3.4–5.3)
SODIUM SERPL-SCNC: 137 MMOL/L (ref 133–144)
TRIGL SERPL-MCNC: 81 MG/DL

## 2018-08-03 PROCEDURE — 80061 LIPID PANEL: CPT | Performed by: FAMILY MEDICINE

## 2018-08-03 PROCEDURE — 83036 HEMOGLOBIN GLYCOSYLATED A1C: CPT | Performed by: FAMILY MEDICINE

## 2018-08-03 PROCEDURE — 80048 BASIC METABOLIC PNL TOTAL CA: CPT | Performed by: FAMILY MEDICINE

## 2018-08-03 PROCEDURE — 36415 COLL VENOUS BLD VENIPUNCTURE: CPT | Performed by: FAMILY MEDICINE

## 2018-08-03 PROCEDURE — 99214 OFFICE O/P EST MOD 30 MIN: CPT | Performed by: FAMILY MEDICINE

## 2018-08-03 ASSESSMENT — ANXIETY QUESTIONNAIRES
3. WORRYING TOO MUCH ABOUT DIFFERENT THINGS: NOT AT ALL
2. NOT BEING ABLE TO STOP OR CONTROL WORRYING: NOT AT ALL
6. BECOMING EASILY ANNOYED OR IRRITABLE: NOT AT ALL
GAD7 TOTAL SCORE: 3
5. BEING SO RESTLESS THAT IT IS HARD TO SIT STILL: SEVERAL DAYS
7. FEELING AFRAID AS IF SOMETHING AWFUL MIGHT HAPPEN: NOT AT ALL
1. FEELING NERVOUS, ANXIOUS, OR ON EDGE: SEVERAL DAYS

## 2018-08-03 ASSESSMENT — PATIENT HEALTH QUESTIONNAIRE - PHQ9: 5. POOR APPETITE OR OVEREATING: SEVERAL DAYS

## 2018-08-03 NOTE — PATIENT INSTRUCTIONS
*   Your weight is down 10 lbs, good job.     *    Will check check cholesterol, blood sugar.     *    For colon cancer screening, turn in the FIT card.     *     Check with any pharmacy about the new shingles shot.     *    Call about setting up one time test for an aneurism of the abdomin. This is because you smoked. Call [phone].

## 2018-08-03 NOTE — LETTER
August 6, 2018      John Younger  [address] [facility] [address]            Shawn,     [facility] find a copy of your recent test results.  They show that you have an acceptable cholesterol and normal kidney function.  Your hemoglobin A1c, a three-month average of your blood sugar readings, is in the prediabetes range at 6.2.   Do not be discouraged, I would recommend that you watch the carbohydrates and continue with a slow weight loss.     Please call with any questions or concerns.  I would recommend a follow-up appointment in 6 months.     Resulted Orders   Lipid panel reflex to direct LDL Fasting   Result Value Ref Range    Cholesterol 231 (H) <200 mg/dL      Comment:      Desirable:       <200 mg/dl    Triglycerides 81 <150 mg/dL      Comment:      Fasting specimen    HDL Cholesterol 40 >39 mg/dL    LDL Cholesterol Calculated 175 (H) <100 mg/dL      Comment:      Above desirable:  100-129 mg/dl  Borderline High:  130-159 mg/dL  High:             160-189 mg/dL  Very high:       >189 mg/dl      Non HDL Cholesterol 191 (H) <130 mg/dL      Comment:      Above Desirable:  130-159 mg/dl  Borderline high:  160-189 mg/dl  High:             190-219 mg/dl  Very high:       >219 mg/dl     Hemoglobin A1c   Result Value Ref Range    Hemoglobin A1C 6.2 (H) 0 - 5.6 %      Comment:      Normal <5.7% Prediabetes 5.7-6.4%  Diabetes 6.5% or higher - adopted from ADA   consensus guidelines.     Basic metabolic panel   Result Value Ref Range    Sodium 137 133 - 144 mmol/L    Potassium 4.5 3.4 - 5.3 mmol/L    Chloride 105 94 - 109 mmol/L    Carbon Dioxide 28 20 - 32 mmol/L    Anion Gap 4 3 - 14 mmol/L    Glucose 116 (H) 70 - 99 mg/dL      Comment:      Fasting specimen    Urea Nitrogen 18 7 - 30 mg/dL    Creatinine 0.95 0.66 - 1.25 mg/dL    GFR Estimate 78 >60 mL/min/1.7m2      Comment:      Non  GFR Calc    GFR Estimate If Black >90 >60 mL/min/1.7m2      Comment:       GFR Calc     Calcium 8.5 8.5 - 10.1 mg/dL       If you have any questions or concerns, please call the clinic at the number listed above.       Sincerely,        Olesya Krishnan MD/[facility]

## 2018-08-03 NOTE — MR AVS SNAPSHOT
After Visit Summary   8/3/2018    John Younger    MRN: [id number]           Patient Information     Date Of Birth          1950        Visit Information        Provider Department      8/3/2018 7:40 AM Olesya Krishnan MD [facility]        Today's Diagnoses     Generalized anxiety disorder    -  1    Screening for AAA (abdominal aortic aneurysm)        History of smoking        Elevated hemoglobin A1c        Hyperlipidemia LDL goal <130        Colon cancer screening        NSAID long-term use          Care Instructions    *   Your weight is down 10 lbs, good job.     *    Will check check cholesterol, blood sugar.     *    For colon cancer screening, turn in the FIT card.     *     Check with any pharmacy about the new shingles shot.     *    Call about setting up one time test for an aneurism of the abdomin. This is because you smoked. Call [phone].               Follow-ups after your visit        Future tests that were ordered for you today     Open Future Orders        Priority Expected Expires Ordered    Fecal colorectal cancer screen (FIT) Routine 8/24/2018 10/26/2018 8/3/2018            Who to contact     Normal or non-critical lab and imaging results will be communicated to you by MyChart, letter or phone within 4 business days after the clinic has received the results. If you do not hear from us within 7 days, please contact the clinic through MyChart or phone. If you have a critical or abnormal lab result, we will notify you by phone as soon as possible.  Submit refill requests through Telinet or call your pharmacy and they will forward the refill request to us. Please allow 3 business days for your refill to be completed.          If you need to speak with a  for additional information , please call: [phone]           Additional Information About Your Visit        Care EveryWhere ID     This is your Care EveryWhere ID. This could be used  "by other organizations to access your [facility] medical records  [id number]        Your Vitals Were     Pulse Height BMI (Body Mass Index)             68 6' 3\" (1.905 m) 26.28 kg/m2          Blood Pressure from Last 3 Encounters:   08/03/18 124/84   02/02/18 124/70   03/28/17 126/84    Weight from Last 3 Encounters:   08/03/18 210 lb 4 oz (95.4 kg)   02/02/18 221 lb (100.2 kg)   03/28/17 221 lb 4 oz (100.4 kg)              We Performed the Following     Basic metabolic panel     Hemoglobin A1c     Lipid panel reflex to direct LDL Fasting     US Abdominal Aorta Limited        Primary Care Provider Office Phone # Fax #    Olesya Krishnan [phone] [address] [place] [address]        Equal Access to Services     [facility] Center[facility]JI : Hadii aad ku hadasho Soomaali, waaxda luqadaha, qaybta kaalmada adeegyada, waxay gopalin hayaakatrina chung . So [facility] [phone].    ATENCIÓN: Si habla español, tiene a corbett disposición servicios gratuitos de asistencia lingüística. [facility] [phone].    We comply with applicable federal civil rights laws and [place] laws. We do not discriminate on the basis of race, color, national origin, age, disability, sex, sexual orientation, or gender identity.            Thank you!     Thank you for choosing [facility] [place]  for your care. Our goal is always to provide you with excellent care. Hearing back from our patients is one way we can continue to improve our services. Please take a few minutes to complete the written survey that you may receive in the mail after your visit with us. Thank you!             Your Updated Medication List - Protect others around you: Learn how to safely use, store and throw away your medicines at www.disposemymeds.org.          This list is accurate as of 8/3/18  8:16 AM.  Always use your most recent med list.                   Brand Name Dispense Instructions for use Diagnosis    atorvastatin 40 MG tablet    " LIPITOR    90 tablet    Take 1 tablet (40 mg) by mouth daily For cholesterol.    Hyperlipidemia LDL goal <130       MULTIVITAMINS PO      1 po qd        QUEtiapine 100 MG tablet    SEROquel    90 tablet    TAKE 1 TABLET BY MOUTH NIGHTLY    Generalized anxiety disorder       vitamin D3 2000 units Caps      Take  by mouth daily.

## 2018-08-04 ASSESSMENT — ANXIETY QUESTIONNAIRES: GAD7 TOTAL SCORE: 3

## 2018-08-04 ASSESSMENT — PATIENT HEALTH QUESTIONNAIRE - PHQ9: SUM OF ALL RESPONSES TO PHQ QUESTIONS 1-9: 3
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
## 2019-01-24 DIAGNOSIS — F41.1 GENERALIZED ANXIETY DISORDER: ICD-10-CM

## 2019-01-24 NOTE — TELEPHONE ENCOUNTER
Routing refill request to provider for review/approval because:  Labs not current:  CBC    Kathe SMITH RN

## 2019-01-24 NOTE — TELEPHONE ENCOUNTER
Requested Prescriptions   Pending Prescriptions Disp Refills     QUEtiapine (SEROQUEL) 100 MG tablet [Pharmacy Med Name: QUETIAPINE FUMARATE 100 MG TAB] 90 tablet 1    Last Written Prescription Date:  08/01/2018 #90 x 1  Last filled 10/26/2018  Last office visit: 8/3/2018 HOLLAND Krishnan     Future Office Visit:   Next 5 appointments (look out 90 days)    Feb 04, 2019  8:00 AM CST  [facility] with [facility]nan [facility]  [facility] [address] [address] [phone]          Sig: TAKE 1 TABLET BY MOUTH NIGHTLY    Antipsychotic Medications Failed - 1/24/2019  2:26 AM       Failed - CBC on file in past 12 months    Recent Labs   Lab Test 01/05/16  0800   WBC 5.9   RBC 4.95   HGB 15.1   HCT 45.0                   Passed - Blood pressure under 140/90 in past 12 months    BP Readings from Last 3 Encounters:   08/03/18 124/84   02/02/18 124/70   03/28/17 126/84                Passed - Patient is 12 years of age or older       Passed - Lipid panel on file within the past 12 months    Recent Labs   Lab Test 08/03/18  0822  01/08/15  0759   CHOL 231*   < > 168   TRIG 81   < > 138   HDL 40   < > 38*   *   < > 102   NHDL 191*   < >  --    VLDL  --   --  28   CHOLHDLRATIO  --   --  4.4    < > = values in this interval not displayed.              Passed - Heart Rate on file within past 12 months    Pulse Readings from Last 3 Encounters:   08/03/18 68   02/02/18 64   03/28/17 74              Passed - A1c or Glucose on file in past 12 months    Recent Labs   Lab Test 08/03/18  0822   *   A1C 6.2*       Please review patients last 3 weights. If a weight gain of >10 lbs exists, you may refill the prescription once after instructing the patient to schedule an appointment within the next 30 days.    Wt Readings from Last 3 Encounters:   08/03/18 95.4 kg (210 lb 4 oz)   02/02/18 100.2 kg (221 lb)   03/28/17 100.4 kg (221 lb 4 oz)            Passed -  "Medication is active on med list       Passed - Recent (6 mo) or future (30 days) visit within the authorizing provider's specialty    Patient had office visit in the last 6 months or has a visit in the next 30 days with authorizing provider or within the authorizing provider's specialty.  See \"Patient Info\" tab in inbasket, or \"Choose Columns\" in Meds & Orders section of the refill encounter.              "

## 2019-01-25 RX ORDER — QUETIAPINE FUMARATE 100 MG/1
TABLET, FILM COATED ORAL
Qty: 90 TABLET | Refills: 1 | Status: SHIPPED | OUTPATIENT
Start: 2019-01-25 | End: 2019-07-17

## 2019-02-02 ENCOUNTER — APPOINTMENT (OUTPATIENT)
Dept: GENERAL RADIOLOGY | Facility: CLINIC | Age: 69
End: 2019-02-02
Payer: COMMERCIAL

## 2019-02-02 ENCOUNTER — HOSPITAL ENCOUNTER (EMERGENCY)
Facility: CLINIC | Age: 69
Discharge: HOME | End: 2019-02-02
Attending: EMERGENCY MEDICINE | Admitting: EMERGENCY MEDICINE
Payer: COMMERCIAL

## 2019-02-02 VITALS
HEART RATE: 94 BPM | BODY MASS INDEX: 27.5 KG/M2 | WEIGHT: 220 LBS | SYSTOLIC BLOOD PRESSURE: 119 MMHG | DIASTOLIC BLOOD PRESSURE: 87 MMHG | TEMPERATURE: 98.3 F

## 2019-02-02 DIAGNOSIS — S66.911A MUSCLE STRAIN OF RIGHT WRIST, INITIAL ENCOUNTER: ICD-10-CM

## 2019-02-02 DIAGNOSIS — S46.911A STRAIN OF RIGHT SHOULDER, INITIAL ENCOUNTER: ICD-10-CM
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
## 2019-02-02 PROCEDURE — 73110 X-RAY EXAM OF WRIST: CPT | Mod: RT

## 2019-02-02 PROCEDURE — 96372 THER/PROPH/DIAG INJ SC/IM: CPT

## 2019-02-02 PROCEDURE — 73060 X-RAY EXAM OF HUMERUS: CPT | Mod: RT

## 2019-02-02 PROCEDURE — 25000128 H RX IP 250 OP 636: Performed by: EMERGENCY MEDICINE

## 2019-02-02 PROCEDURE — 25000132 ZZH RX MED GY IP 250 OP 250 PS 637: Performed by: EMERGENCY MEDICINE

## 2019-02-02 PROCEDURE — 99284 EMERGENCY DEPT VISIT MOD MDM: CPT | Mod: 25

## 2019-02-02 PROCEDURE — 99284 EMERGENCY DEPT VISIT MOD MDM: CPT | Mod: Z6 | Performed by: EMERGENCY MEDICINE

## 2019-02-02 RX ORDER — SODIUM PHOSPHATE,MONO-DIBASIC 19G-7G/118
1 ENEMA (ML) RECTAL
COMMUNITY

## 2019-02-02 RX ORDER — ASPIRIN 325 MG
650 TABLET ORAL ONCE
COMMUNITY

## 2019-02-02 RX ORDER — OXYCODONE AND ACETAMINOPHEN 5; 325 MG/1; MG/1
1-2 TABLET ORAL EVERY 4 HOURS PRN
Qty: 12 TABLET | Refills: 0 | Status: SHIPPED | OUTPATIENT
Start: 2019-02-02 | End: 2019-02-05

## 2019-02-02 RX ORDER — IBUPROFEN 400 MG/1
800 TABLET, FILM COATED ORAL ONCE
Status: COMPLETED | OUTPATIENT
Start: 2019-02-02 | End: 2019-02-02

## 2019-02-02 RX ORDER — MULTIVITAMIN,THERAPEUTIC
1 TABLET ORAL
COMMUNITY

## 2019-02-02 RX ADMIN — IBUPROFEN 800 MG: 400 TABLET ORAL at 14:17

## 2019-02-02 RX ADMIN — HYDROMORPHONE HYDROCHLORIDE 1 MG: 1 INJECTION, SOLUTION INTRAMUSCULAR; INTRAVENOUS; SUBCUTANEOUS at 14:19

## 2019-02-02 ASSESSMENT — ENCOUNTER SYMPTOMS
BACK PAIN: 0
NECK PAIN: 0
NUMBNESS: 0
HEADACHES: 0
ARTHRALGIAS: 1
COUGH: 0
ABDOMINAL PAIN: 0
CONFUSION: 0

## 2019-02-02 NOTE — ED TRIAGE NOTES
Right shoulder pain.  Patient was washing cars and slipped on ice.  Patient braced fall with right arm and felt a pop.

## 2019-02-02 NOTE — ED AVS SNAPSHOT
[facility] Emergency Department  [address] [address]  Phone:  [phone]  Fax:  [phone]                                    John Younger   MRN: [id number]    Department:  [facility] Emergency Department   Date of Visit:  2/2/2019           After Visit Summary Signature Page    I have received my discharge instructions, and my questions have been answered. I have discussed any challenges I see with this plan with the nurse or doctor.    ..........................................................................................................................................  Patient/Patient Representative Signature      ..........................................................................................................................................  Patient Representative Print Name and Relationship to Patient    ..................................................               ................................................  Date                                   Time    ..........................................................................................................................................  Reviewed by Signature/Title    ...................................................              ..............................................  Date                                               Time          22EPIC Rev 08/18

## 2019-02-02 NOTE — ED PROVIDER NOTES
"  History     Chief Complaint   Patient presents with     Shoulder Injury     HPI  John Younger is a 69 year old male who presents to the Emergency Department with concern for right arm injury. Patient fell in garage just prior to arrival. He fell on an outstretched arm then hit his elbow on the cement which caused a shooting pain into his shoulder. He did not hit his head. He complains of moderate right mid-humeral pain, wrist and elbow pain. No pain in the fingers. He denies chest, back or neck pain. No significant numbness.  Right-hand dominant.  Denies previous injury.  Denies any neck or back pain.  No treatment prior to arrival.    Allergies:  No Known Allergies    Problem List:    Patient Active Problem List    Diagnosis Date Noted     Advanced directives, counseling/discussion 02/13/2017     Priority: Medium     Advance Care Planning 2/13/2017: Information given             Generalized anxiety disorder 01/05/2016     Priority: Medium     February 5, 2016 recent exacerbation, now improvement with addition of  Effexor XR 75 mg daily, plus Seroquel 100 mg at bedtime, mainly for sleep. Plan: continue current medications, follow up 6 months.        24 hour handout given 01/30/2012     Priority: Medium     EMERGENCY CARE PLAN  Presenting Problem Signs and Symptoms Treatment Plan    Questions or conerns during clinic hours    I will call the clinic directly     Questions or conerns outside clinic hours    I will call the 24 hour nurse line at [phone]    Patient needs to schedule an appointment    I will call the 24 hour scheduling team at [phone] or clinic directly    Same day treatment     I will call the clinic first, nurse line if after hours, urgent care and express care if needed                                 Irregular heart beat 07/30/2011     Priority: Medium     March 8, 2016: Notes \"fluttering\" feeling in heart and mild chest pain (that appeared muscular) that began when Effexor-ER dose " increased. EKG normal and symptoms not consistent with CV chest pain. Probable medication reaction, will return to original dose.    February 5, 2016  doing well, denies any irregular heartbeats.    July 30, 2011 notes couplets on exam. EKG shows sinus rhythm, frequent PAC's. Benign. No further workup needed at this time.          Overweight (BMI 25.0-29.9) 07/30/2011     Priority: Medium     January 3, 2014 Body mass index is 27.62 kg/(m^2).          Elevated hemoglobin A1c 07/25/2011     Priority: Medium     July 30, 2011 6.4. Discussed lifestyle, diet, weight loss, regular exercise. Definitely at risk for diabetes. Repeat A1c in 6 months.    Ashley 3, 2013 now back up to 6.4. Stressed lifestyle. Repeat A1c in 6 months.   January 3, 2014  Improve diet,  A1C 6.0. Repeat in 6 months  February 5, 2016 now 6.1, discussed diet. Recheck 6-12 months.        Hyperlipidemia LDL goal <130 12/11/2008     Priority: Medium     January 3, 2014  risks factors: family history, nonsmoker, male, age. On simvastatin 40 mg daily.  Normal liver function tests. LDL:  165. Will change to atorvastatin 40 mg daily. Recheck lipids in 6-12  months.                 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 Father         bypass surgery, dx at age 60     Hypertension Sister      Hypertension Sister      Gynecology Sister          hysterectomy     Gynecology Sister         hysterectomy     Depression Brother      Hypertension Brother        Social History:  Marital Status:   [2]  Social History     Tobacco Use     Smoking status: Former Smoker     Last attempt to quit: 7/15/1983     Years since quittin.5     Tobacco comment: started smoking at age 13 got up to 3 packs a day before quiting   Substance Use Topics     Alcohol use: Yes     Comment: maybe 6 beers a month at the most     Drug use: No        Medications:      aspirin (ASA) 325 MG tablet   glucosamine-chondroitin 500-400 MG CAPS per capsule   multivitamin, therapeutic (THERA-VIT) TABS tablet   oxyCODONE-acetaminophen (PERCOCET) 5-325 MG tablet   QUEtiapine (SEROQUEL) 100 MG tablet   atorvastatin (LIPITOR) 40 MG tablet         Review of Systems   Respiratory: Negative for cough.    Cardiovascular: Negative for chest pain.   Gastrointestinal: Negative for abdominal pain.   Musculoskeletal: Positive for arthralgias (Right elbow, arm and wrist pain). Negative for back pain and neck pain.   Neurological: Negative for numbness and headaches.   Psychiatric/Behavioral: Negative for confusion.   All other systems reviewed and are negative.      Physical Exam   BP: 119/87  Pulse: 94  Temp: 98.3  F (36.8  C)  Weight: 99.8 kg (220 lb)      Physical Exam general, alert cooperative male in moderate distress.  HEENT is atraumatic.  Neck and back are nontender.  Scapula was nontender.  The shoulder is fully mobile but pain at the extremes.  On palpation there is no crepitus or step-off.  No joint effusion at the shoulder, elbow, or wrist.  He does have tenderness and swelling over the distal wrist without involvement of the hand.  Skin exam reveals no abrasions, bruising, or rashes.    ED Course        Procedures           Results for orders placed or performed during the hospital encounter of 19   XR Humerus Right G/E 2 Views    Narrative    RIGHT HUMERUS TWO OR MORE VIEWS    2/2/2019 2:35 PM     HISTORY: Fall with mid humeral pain.    COMPARISON: None.      Impression    IMPRESSION: No acute fracture or dislocation. Anatomic alignment.   XR Wrist Right G/E 3 Views    Narrative    RIGHT WRIST THREE OR MORE VIEWS   2/2/2019 2:35 PM     HISTORY: Fall with wrist pain.    COMPARISON: None.      Impression    IMPRESSION: No acute fracture or dislocation. Anatomic alignment.  Prominent thumb carpometacarpal joint DJD.            Critical Care time:  none               Results for orders placed or performed during the hospital encounter of 02/02/19 (from the past 24 hour(s))   XR Humerus Right G/E 2 Views    Narrative    RIGHT HUMERUS TWO OR MORE VIEWS   2/2/2019 2:35 PM     HISTORY: Fall with mid humeral pain.    COMPARISON: None.      Impression    IMPRESSION: No acute fracture or dislocation. Anatomic alignment.   XR Wrist Right G/E 3 Views    Narrative    RIGHT WRIST THREE OR MORE VIEWS   2/2/2019 2:35 PM     HISTORY: Fall with wrist pain.    COMPARISON: None.      Impression    IMPRESSION: No acute fracture or dislocation. Anatomic alignment.  Prominent thumb carpometacarpal joint DJD.       Medications   ibuprofen (ADVIL/MOTRIN) tablet 800 mg (800 mg Oral Given 2/2/19 1417)   HYDROmorphone (DILAUDID) injection 1 mg (1 mg Intramuscular Given 2/2/19 1419)       2:10 PM Patient assessed.     Assessments & Plan (with Medical Decision Making)   John Younger is a 69 year old male who presents to the Emergency Department with concern for right arm injury. Patient fell in garage just prior to arrival. He fell on an outstretched arm then hit his elbow on the cement which caused a shooting pain into his shoulder. He did not hit his head. He complains of moderate right mid-humeral pain, wrist and elbow pain. No pain in the fingers. He denies chest, back or neck pain. No significant numbness.  Right-hand dominant.  Denies previous injury.  Denies any neck or back pain.  No treatment prior to  arrival.  On exam patient was afebrile and vitally stable.  His head and neck are unremarkable.  His back was unremarkable.  Mild proximal to mid humeral tenderness without crepitus or step-off.  Distal wrist tenderness and swelling.  No effusions of the shoulder elbow or wrist.  Hand is unaffected.  X-rays of the shoulder and wrist did not show acute fracture.  Suspect patient has strained his wrist and shoulder.  He can use ice for swelling.  Ibuprofen for pain.  Percocet for severe pain.  Sling as needed.  Range of motion as soon as pain allows to prevent motion shoulder.  Reasons to return to the emergency room were discussed.  Handout on extremity strain is provided.  I have reviewed the nursing notes.    I have reviewed the findings, diagnosis, plan and need for follow up with the patient.          Medication List      Started    oxyCODONE-acetaminophen 5-325 MG tablet  Commonly known as:  PERCOCET  1-2 tablets, Oral, EVERY 4 HOURS PRN            Final diagnoses:   Fall, initial encounter   Muscle strain of right wrist, initial encounter   Strain of right shoulder, initial encounter     This document serves as a record of the services and decisions personally performed and made by Jourdan Prakash MD. It was created on his behalf by Jazz Fierro, a trained medical scribe. The creation of this document is based the provider's statements to the medical scribe.  Jazz Fierro 2:10 PM 2/2/2019    Provider:   The information in this document, created by the medical scribe for me, accurately reflects the services I personally performed and the decisions made by me. I have reviewed and approved this document for accuracy prior to leaving the patient care area.  Jourdan Prakash MD 2:10 PM 2/2/2019 2/2/2019   [facility] EMERGENCY DEPARTMENT     Jourdan Prakash MD  02/02/19 [id number]

## 2019-02-02 NOTE — ED NOTES
Right shoulder, elbow, and wrist pain.  Patient was washing cars and slipped on ice.  Patient braced fall with right hand and felt pop.  No loss of consciousness.  Perez Hammonds RN on 2/2/2019 at 2:06 PM

## 2019-02-12 ENCOUNTER — TELEPHONE (OUTPATIENT)
Dept: FAMILY MEDICINE | Facility: CLINIC | Age: 69
End: 2019-02-12

## 2019-02-12 NOTE — LETTER
February 12, 2019      Shawn Younger  [address]  [place] [facility] [place] [address]        Dear Shawn,     As part of [facility]'s commitment to health and wellness we have reviewed your chart and it indicates that you are due for one or more of the following:    -- Colon screen. Colonoscopy or FIT test (take home test). The last FIT test that we have on file for you was from 11/13/2014. This test is recommended yearly.  Please call one of the following numbers to schedule a colonoscopy:  [facility] [phone]  [facility] [phone]  [facility] [phone]  [facility] [phone] (multiple sites, call for locations)  OR....  If you prefer to do another FIT test please call us at [phone] and we can arrange to have the kit picked up at the .    Please try to complete the test above in the next 2-4 weeks.    While we work hard to maintain accurate records, it is always possible that this notice does not accurately reflect tests that you may have had. To ensure that we do not send you unnecessary notices please verify that we have accurate dates of your tests (even if these were done many years ago) or if you are seeking care at another clinic.      Sincerely,     Olesya Krishnan MD / connie

## 2019-02-12 NOTE — TELEPHONE ENCOUNTER
Panel Management Review      Patient has the following on his problem list: None      Composite cancer screening  Chart review shows that this patient is due/due soon for the following Colonoscopy/FIT  Summary:    Patient is due/failing the following:   COLONOSCOPY/FIT    Action needed:   Patient needs referral/order: colon screen    Type of outreach:    Sent letter.    Questions for provider review:    None                                                                                                                                    Tess TAYLOR       Chart routed to none .

## 2019-07-17 DIAGNOSIS — F41.1 GENERALIZED ANXIETY DISORDER: ICD-10-CM

## 2019-07-17 RX ORDER — QUETIAPINE FUMARATE 100 MG/1
TABLET, FILM COATED ORAL
Qty: 90 TABLET | Refills: 1 | Status: SHIPPED | OUTPATIENT
Start: 2019-07-17 | End: 2020-01-02

## 2019-07-29 NOTE — PROGRESS NOTES
Subjective     John Younger is a 69 year old male who presents to clinic today for the following health issues:    HPI     Hyperlipidemia Follow-Up  Atorvastatin 40mg every day     Are you having any of the following symptoms? (Select all that apply)  No complaints of shortness of breath, chest pain or pressure.  No increased sweating or nausea with activity.  No left-sided neck or arm pain.  No complaints of pain in calves when walking 1-2 blocks.    Are you regularly taking any medication or supplement to lower your cholesterol?   Yes- atorvastatin 40 mg    Are you having muscle aches or other side effects that you think could be caused by your cholesterol lowering medication?  No      Anxiety Follow-Up  Seroquel 100mg qhs    How are you doing with your anxiety since your last visit? No change    Are you having other symptoms that might be associated with anxiety? No    Have you had a significant life event? No     Are you feeling depressed? No    Do you have any concerns with your use of alcohol or other drugs? No    Social History     Tobacco Use     Smoking status: Former Smoker     Last attempt to quit: 7/15/1983     Years since quittin.0     Smokeless tobacco: Never Used     Tobacco comment: started smoking at age 13 got up to 3 packs a day before quiting   Substance Use Topics     Alcohol use: Yes     Comment: maybe 6 beers a month at the most     Drug use: No     LEOLA-7 SCORE 2016 2016 8/3/2018   Total Score - - -   Total Score 2 0 3     PHQ 2016 2016 8/3/2018   PHQ-9 Total Score 2 0 3   Q9: Thoughts of better off dead/self-harm past 2 weeks Not at all Not at all Not at all       - FIT test last completed 2014 with normal results. He is on a routine annual schedule for this and is over due to repeat.        Amount of exercise or physical activity: No regular exercise, does work in the yard     Problems taking medications regularly: No    Medication side effects:  "none    Diet: watching sugar intake        Reviewed and updated as needed this visit by Provider         Review of Systems   ROS COMP: Constitutional, HEENT, cardiovascular, pulmonary, gi and gu systems are negative, except as otherwise noted.        Objective    /80   Pulse 68   Temp 98.3  F (36.8  C) (Tympanic)   Resp 12   Ht 1.905 m (6' 3\")   Wt 99.8 kg (220 lb)   BMI 27.50 kg/m    Body mass index is 27.5 kg/m .  Physical Exam   GENERAL: alert. pleasant and no distress  RESP: lungs clear to auscultation - no rales, rhonchi or wheezes  CV: regular rate and rhythm, normal S1 S2  ABDOMEN: soft, nontender  MS: no gross musculoskeletal defects noted, no edema  SKIN: no suspicious lesions or rashes  PSYCH: mentation appears normal, affect normal/bright    Diagnostic Test Results:    Results for orders placed or performed in visit on 08/09/19   Lipid panel reflex to direct LDL Fasting   Result Value Ref Range    Cholesterol 191 <200 mg/dL    Triglycerides 107 <150 mg/dL    HDL Cholesterol 40 >39 mg/dL    LDL Cholesterol Calculated 130 (H) <100 mg/dL    Non HDL Cholesterol 151 (H) <130 mg/dL   Hemoglobin A1c   Result Value Ref Range    Hemoglobin A1C 6.0 (H) 0 - 5.6 %        Assessment & Plan        (E78.5) Hyperlipidemia LDL goal <130  (primary encounter diagnosis)  Comment: Patient recently discontinued his statin, advised that he restart atorvastatin.  Plan: Lipid panel reflex to direct LDL Fasting,         atorvastatin (LIPITOR) 40 MG tablet        Continue.  1 year refill.    (R73.09) Elevated hemoglobin A1c  Comment: Reviewed with the patient is considered prediabetic.  Plan: Hemoglobin A1c        Stressed lifestyle.  Repeat A1c in 6 to 12 months.    (Z12.11) Colon cancer screening  Comment: Reviewed the need for colon cancer screening.  Plan: Fecal colorectal cancer screen (FIT)    (F41.1) Generalized anxiety disorder  Comment: Appears to be doing reasonably well on Seroquel.  Plan: Continue.  " "Follow-up in 6 months.    There are no Patient Instructions on file for this visit.     BMI:   Estimated body mass index is 27.5 kg/m  as calculated from the following:    Height as of this encounter: 1.905 m (6' 3\").    Weight as of this encounter: 99.8 kg (220 lb).             Olesya Krishnan MD  [facility]        "

## 2019-08-09 ENCOUNTER — OFFICE VISIT (OUTPATIENT)
Dept: FAMILY MEDICINE | Facility: CLINIC | Age: 69
End: 2019-08-09
Payer: COMMERCIAL

## 2019-08-09 VITALS
SYSTOLIC BLOOD PRESSURE: 126 MMHG | HEIGHT: 75 IN | RESPIRATION RATE: 12 BRPM | BODY MASS INDEX: 27.35 KG/M2 | DIASTOLIC BLOOD PRESSURE: 80 MMHG | TEMPERATURE: 98.3 F | HEART RATE: 68 BPM | WEIGHT: 220 LBS

## 2019-08-09 DIAGNOSIS — R73.09 ELEVATED HEMOGLOBIN A1C: ICD-10-CM

## 2019-08-09 DIAGNOSIS — F41.1 GENERALIZED ANXIETY DISORDER: ICD-10-CM

## 2019-08-09 DIAGNOSIS — Z12.11 COLON CANCER SCREENING: ICD-10-CM

## 2019-08-09 DIAGNOSIS — E78.5 HYPERLIPIDEMIA LDL GOAL <130: Primary | ICD-10-CM

## 2019-08-09 LAB
CHOLEST SERPL-MCNC: 191 MG/DL
HBA1C MFR BLD: 6 % (ref 0–5.6)
HDLC SERPL-MCNC: 40 MG/DL
LDLC SERPL CALC-MCNC: 130 MG/DL
NONHDLC SERPL-MCNC: 151 MG/DL
TRIGL SERPL-MCNC: 107 MG/DL

## 2019-08-09 PROCEDURE — 83036 HEMOGLOBIN GLYCOSYLATED A1C: CPT | Performed by: FAMILY MEDICINE

## 2019-08-09 PROCEDURE — 99214 OFFICE O/P EST MOD 30 MIN: CPT | Performed by: FAMILY MEDICINE

## 2019-08-09 PROCEDURE — 36415 COLL VENOUS BLD VENIPUNCTURE: CPT | Performed by: FAMILY MEDICINE

## 2019-08-09 PROCEDURE — 80061 LIPID PANEL: CPT | Performed by: FAMILY MEDICINE

## 2019-08-09 RX ORDER — ATORVASTATIN CALCIUM 40 MG/1
40 TABLET, FILM COATED ORAL DAILY
Qty: 90 TABLET | Refills: 3 | Status: SHIPPED | OUTPATIENT
Start: 2019-08-09 | End: 2020-12-15

## 2019-08-09 ASSESSMENT — PAIN SCALES - GENERAL: PAINLEVEL: NO PAIN (0)

## 2019-08-09 ASSESSMENT — MIFFLIN-ST. JEOR: SCORE: 1838.54

## 2019-10-24 ENCOUNTER — TELEPHONE (OUTPATIENT)
Dept: FAMILY MEDICINE | Facility: CLINIC | Age: 69
End: 2019-10-24

## 2019-10-24 NOTE — LETTER
October 24, 2019    John JOSE MIGUEL Svetlanajaydon  [address] [facility] [address]    Dear Siva Escobar cares about your health and your health plan.  I have reviewed your medical conditions, medication list and lab results, and am making recommendations based on this review to better manage your health.    You are in particular need of attention regarding:  -Colon Cancer Screening  -Wellness (Physical) Visit     I am recommending that you:     -schedule a WELLNESS (Physical) APPOINTMENT with me.       -schedule a COLONOSCOPY to look for colon cancer (due every 10 years or 5 years in higher risk situations.)        Colon cancer is now the second leading cause of cancer-related deaths in the [place] [facility] for both men and women and there are over 130,000 new cases and 50,000 deaths per year from colon cancer.  Colonoscopies can prevent 90-95% of these deaths.  Problem lesions can be removed before they ever become cancer.  This test is not only looking for cancer, but also getting rid of precancerious lesions.    If you are under/uninsured, we recommend you contact the [employer] program. [employer] is a free colorectal cancer screening program that provides colonoscopies for eligible under/uninsured [place] men and women. If you are interested in receiving a free colonoscopy, please call [employer] at [phone] (mention code [employer]) to see if you re eligible.      If you do not wish to do a colonoscopy or cannot afford to do one, at this time, there is another option. It is called a FIT test or Fecal Immunochemical Occult Blood Test (take home stool sample kit).  It does not replace the colonoscopy for colorectal cancer screening, but it can detect hidden bleeding in the lower colon.  It does need to be repeated every year and if a positive result is obtained, you would be referred for a colonoscopy.          If you have completed either one of these tests at another facility, please call with  the details of when and where the tests were done and if they were normal or not. Or have the records sent to our clinic so that we can best coordinate your care.      Please call us at the [phone] or use [employer] to address the above recommendations.     Thank you for trusting [facility].  We appreciate the opportunity to serve you and look forward to supporting your healthcare in the future.    If you have (or plan to have) any of these tests done at a facility other than a [facility] or a [facility], please have the results sent to the [facility] location noted above.      Best Regards,    Olesya Krishnan MD

## 2020-01-01 DIAGNOSIS — F41.1 GENERALIZED ANXIETY DISORDER: ICD-10-CM

## 2020-01-02 RX ORDER — QUETIAPINE FUMARATE 100 MG/1
TABLET, FILM COATED ORAL
Qty: 90 TABLET | Refills: 0 | Status: SHIPPED | OUTPATIENT
Start: 2020-01-02 | End: 2020-03-20

## 2020-01-02 NOTE — TELEPHONE ENCOUNTER
Prescription approved per G Refill Protocol or patient Primary care provider (PCP) Chart and last OV reviewed   MAX Cotter RN/Adam Ambrose

## 2020-01-02 NOTE — TELEPHONE ENCOUNTER
QUETIAPINE FUMARATE 100 MG TAB  Last Written Prescription Date:  7/17/19  Last Fill Quantity: 90,  # refills: 1   Last office visit: 8/9/2019 with prescribing provider:  CONCETTA   Future Office Visit:   Next 5 appointments (look out 90 days)    Feb 12, 2020  7:40 AM CST  Office Visit with Olesya Krishnan MD  [facility] [address] [facility] [address] [phone]         Requested Prescriptions   Pending Prescriptions Disp Refills     QUEtiapine (SEROQUEL) 100 MG tablet [Pharmacy Med Name: QUETIAPINE FUMARATE 100 MG TAB] 90 tablet 1     Sig: TAKE 1 TABLET BY MOUTH NIGHTLY       Antipsychotic Medications Failed - 1/1/2020  8:01 PM        Failed - CBC on file in past 12 months     Recent Labs   Lab Test 01/05/16  0800   WBC 5.9   RBC 4.95   HGB 15.1   HCT 45.0                    Passed - Blood pressure under 140/90 in past 12 months     BP Readings from Last 3 Encounters:   08/09/19 126/80   02/02/19 119/87   08/03/18 124/84                 Passed - Patient is 12 years of age or older        Passed - Lipid panel on file within the past 12 months     Recent Labs   Lab Test 08/09/19  0805  01/08/15  0759   CHOL 191   < > 168   TRIG 107   < > 138   HDL 40   < > 38*   *   < > 102   NHDL 151*   < >  --    VLDL  --   --  28   CHOLHDLRATIO  --   --  4.4    < > = values in this interval not displayed.               Passed - Heart Rate on file within past 12 months     Pulse Readings from Last 3 Encounters:   08/09/19 68   02/02/19 94   08/03/18 68               Passed - A1c or Glucose on file in past 12 months     Recent Labs   Lab Test 08/09/19  0805 08/03/18  0822   GLC  --  116*   A1C 6.0* 6.2*       Please review patients last 3 weights. If a weight gain of >10 lbs exists, you may refill the prescription once after instructing the patient to schedule an appointment within the next 30 days.    Wt Readings from Last 3 Encounters:   08/09/19 99.8 kg (220  "lb)   02/02/19 99.8 kg (220 lb)   08/03/18 95.4 kg (210 lb 4 oz)             Passed - Medication is active on med list        Passed - Recent (6 mo) or future (30 days) visit within the authorizing provider's specialty     Patient had office visit in the last 6 months or has a visit in the next 30 days with authorizing provider or within the authorizing provider's specialty.  See \"Patient Info\" tab in inbasket, or \"Choose Columns\" in Meds & Orders section of the refill encounter.                "

## 2020-02-25 ENCOUNTER — TELEPHONE (OUTPATIENT)
Dept: FAMILY MEDICINE | Facility: CLINIC | Age: 70
End: 2020-02-25

## 2020-02-25 NOTE — LETTER
[address] [address]  [phone]  February 25, 2020    John Younger  [address] [facility] [address]    Dear John,    We care about your health and have reviewed your health plan including your medical conditions, medication list, and lab results.  Based on this review, it is recommended that you follow up regarding the following health topic(s):     -Diabetes  -Colon Cancer Screening  -Wellness (Physical) Visit     We recommend you take the following action(s):  -schedule a WELLNESS (Physical) APPOINTMENT.  We will perform the following labs: A1c, BMP (basic metabolic panel), Microablumin and TSH (thyroid test).    Please call us at [phone] (or use [employer]) to address the above recommendations.     Thank you for trusting [facility] and we appreciate the opportunity to serve you.  We look forward to supporting your healthcare needs in the future.    Healthy Regards,      Your Health Care Team  [facility]

## 2020-02-25 NOTE — TELEPHONE ENCOUNTER
Panel Management Review          Composite cancer screening  Chart review shows that this patient is due/due soon for the following Colonoscopy and Fecal Colorectal (FIT)  Summary:    Patient is due/failing the following:   COLONOSCOPY, FIT and PHYSICAL    Action needed:   Patient needs office visit for wellness visit/med check with non fasting labs. Will address need for colonoscopy at visit.    Type of outreach:    Sent letter.    Questions for provider review:    None                                                                                                                                    Awa Ayala CMA

## 2020-03-20 ENCOUNTER — VIRTUAL VISIT (OUTPATIENT)
Dept: FAMILY MEDICINE | Facility: CLINIC | Age: 70
End: 2020-03-20
Payer: COMMERCIAL

## 2020-03-20 DIAGNOSIS — F41.1 GENERALIZED ANXIETY DISORDER: ICD-10-CM

## 2020-03-20 DIAGNOSIS — E78.5 HYPERLIPIDEMIA LDL GOAL <130: Primary | ICD-10-CM

## 2020-03-20 PROCEDURE — 99441 ZZC PHYSICIAN TELEPHONE EVALUATION 5-10 MIN: CPT | Performed by: FAMILY MEDICINE

## 2020-03-20 RX ORDER — QUETIAPINE FUMARATE 100 MG/1
TABLET, FILM COATED ORAL
Qty: 90 TABLET | Refills: 3 | Status: SHIPPED | OUTPATIENT
Start: 2020-03-20 | End: 2021-03-15

## 2020-03-20 ASSESSMENT — ANXIETY QUESTIONNAIRES
3. WORRYING TOO MUCH ABOUT DIFFERENT THINGS: NOT AT ALL
6. BECOMING EASILY ANNOYED OR IRRITABLE: NOT AT ALL
GAD7 TOTAL SCORE: 0
IF YOU CHECKED OFF ANY PROBLEMS ON THIS QUESTIONNAIRE, HOW DIFFICULT HAVE THESE PROBLEMS MADE IT FOR YOU TO DO YOUR WORK, TAKE CARE OF THINGS AT HOME, OR GET ALONG WITH OTHER PEOPLE: NOT DIFFICULT AT ALL
1. FEELING NERVOUS, ANXIOUS, OR ON EDGE: NOT AT ALL
2. NOT BEING ABLE TO STOP OR CONTROL WORRYING: NOT AT ALL
5. BEING SO RESTLESS THAT IT IS HARD TO SIT STILL: NOT AT ALL
7. FEELING AFRAID AS IF SOMETHING AWFUL MIGHT HAPPEN: NOT AT ALL

## 2020-03-20 ASSESSMENT — PATIENT HEALTH QUESTIONNAIRE - PHQ9: 5. POOR APPETITE OR OVEREATING: NOT AT ALL

## 2020-03-21 ASSESSMENT — ANXIETY QUESTIONNAIRES: GAD7 TOTAL SCORE: 0

## 2020-11-02 ENCOUNTER — OFFICE VISIT (OUTPATIENT)
Dept: FAMILY MEDICINE | Facility: CLINIC | Age: 70
End: 2020-11-02
Payer: COMMERCIAL

## 2020-11-02 VITALS
WEIGHT: 205 LBS | DIASTOLIC BLOOD PRESSURE: 86 MMHG | BODY MASS INDEX: 25.49 KG/M2 | HEIGHT: 75 IN | RESPIRATION RATE: 16 BRPM | TEMPERATURE: 97.8 F | HEART RATE: 88 BPM | SYSTOLIC BLOOD PRESSURE: 138 MMHG

## 2020-11-02 DIAGNOSIS — M70.22 OLECRANON BURSITIS OF LEFT ELBOW: Primary | ICD-10-CM

## 2020-11-02 PROCEDURE — 99213 OFFICE O/P EST LOW 20 MIN: CPT | Performed by: NURSE PRACTITIONER

## 2020-11-02 ASSESSMENT — MIFFLIN-ST. JEOR: SCORE: 1765.5

## 2020-11-02 NOTE — PROGRESS NOTES
"Subjective     John Younger is a 70 year old male who presents to clinic today for the following health issues:    HPI         Musculoskeletal problem/pain  Onset/Duration: 1 weeks   Description  Location: elbow - left  Joint Swelling: YES  Redness: YES  Pain: YES  Warmth: YES  Intensity:  moderate  Progression of Symptoms:  worsening  Accompanying signs and symptoms:   Fevers: no  Numbness/tingling/weakness: no  History  Trauma to the area: YES- bumped elbow   Recent illness:  no  Previous similar problem: YES  Previous evaluation:  no  Precipitating or alleviating factors:  Aggravating factors include:  Bumping or using   Therapies tried and outcome: nothing    Feels fine, no fever or chills    Review of Systems   Constitutional, HEENT, cardiovascular, pulmonary, gi and gu systems are negative, except as otherwise noted.      Objective    /86 (Cuff Size: Adult Large)   Pulse 88   Temp 97.8  F (36.6  C) (Tympanic)   Resp 16   Ht 1.905 m (6' 3\")   Wt 93 kg (205 lb)   BMI 25.62 kg/m    Body mass index is 25.62 kg/m .  Physical Exam   GENERAL: healthy, alert and no distress  MS: large fluid filled sac left elbow, no warmth or erythema present   NEURO: Normal strength and tone, mentation intact and speech normal  PSYCH: mentation appears normal, affect normal/bright          Assessment & Plan     Olecranon bursitis of left elbow  No acute distress, afebrile.  Symptoms consistent with bursitis.  Discussion on alternative diagnosis including but not limited to septic joint and gout, Shawn declined further work up at this time.  He agrees to follow up with any worsening or ongoing symptoms.  Recommend rest, ice and antiinflammatories for symptoms.    Home care instructions were reviewed with the patient. The risks, benefits and treatment options of prescribed medications or other treatments have been discussed with the patient. The patient verbalized their understanding and should call or follow up if no " improvement or if they develop further problems.         Patient Instructions     Patient Education     Bursitis    You have bursitis. This is an inflammation of the bursa. These are small, fluid-filled sacs that surround the larger joints of the body. The bursa help the muscles and tendons move smoothly over the joints.  Bursitis often happens in the shoulder. But it can also affect the elbows, hips, pelvis, knees, toes, and heels. Bursitis can be caused by injury, overuse of the joint, or infection of the bursa. Symptoms include pain and tenderness over a joint. Symptoms get worse with movement.  Bursitis is treated with an anti-inflammatory medicine and by resting the joint. More severe cases require injection of medicine directly into the bursa. In the case of infection, surgery and antibiotics may be needed.  Home care    Rest the painful joint and protect it from movement. This will allow the inflammation to heal faster.    Apply an ice pack over the injured area for no more than 15 to 20 minutes. Do this every 3 to 6 hours for the first 24 to 48 hours. Keep using ice packs 3 to 4 times a day until the pain and swelling improves.     To make an ice pack, put ice cubes in a sealed plastic bag. Wrap the bag in a clean, thin towel or cloth. Never put ice or an ice pack directly on the skin. As the ice melts, be careful to not to get the wrap or splint wet.    You may take over-the-counter pain medicine to treat pain and inflammation, unless another medicine was prescribed. Anti-inflammatory pain medicines may be more effective. Talk with your provider before using these medicines if you have chronic liver or kidney disease, or ever had a stomach ulcer or gastrointestinal bleeding.    As your symptoms improve, slowly begin to move the joint. Don't overuse the joint. This may cause the symptoms to flare up again.  When to seek medical advice  Call your healthcare provider right away if any of these  occur:    Redness or warmth over the painful area    Increasing pain or swelling at the joint    Fever of 100.4 F (38 C) or above lasting for 24 to 48 hours, or as advised    [employer] last reviewed this educational content on 5/1/2018 2000-2020 [employer]. [address] [place] [id number]. All rights reserved. This information is not intended as a substitute for professional medical care. Always follow your healthcare professional's instructions.               Return in about 1 week (around 11/9/2020), or if symptoms worsen or fail to improve.    GAMA Yeung CNP  [facility]

## 2020-11-02 NOTE — PATIENT INSTRUCTIONS
Patient Education     Bursitis    You have bursitis. This is an inflammation of the bursa. These are small, fluid-filled sacs that surround the larger joints of the body. The bursa help the muscles and tendons move smoothly over the joints.  Bursitis often happens in the shoulder. But it can also affect the elbows, hips, pelvis, knees, toes, and heels. Bursitis can be caused by injury, overuse of the joint, or infection of the bursa. Symptoms include pain and tenderness over a joint. Symptoms get worse with movement.  Bursitis is treated with an anti-inflammatory medicine and by resting the joint. More severe cases require injection of medicine directly into the bursa. In the case of infection, surgery and antibiotics may be needed.  Home care    Rest the painful joint and protect it from movement. This will allow the inflammation to heal faster.    Apply an ice pack over the injured area for no more than 15 to 20 minutes. Do this every 3 to 6 hours for the first 24 to 48 hours. Keep using ice packs 3 to 4 times a day until the pain and swelling improves.     To make an ice pack, put ice cubes in a sealed plastic bag. Wrap the bag in a clean, thin towel or cloth. Never put ice or an ice pack directly on the skin. As the ice melts, be careful to not to get the wrap or splint wet.    You may take over-the-counter pain medicine to treat pain and inflammation, unless another medicine was prescribed. Anti-inflammatory pain medicines may be more effective. Talk with your provider before using these medicines if you have chronic liver or kidney disease, or ever had a stomach ulcer or gastrointestinal bleeding.    As your symptoms improve, slowly begin to move the joint. Don't overuse the joint. This may cause the symptoms to flare up again.  When to seek medical advice  Call your healthcare provider right away if any of these occur:    Redness or warmth over the painful area    Increasing pain or swelling at the  joint    Fever of 100.4 F (38 C) or above lasting for 24 to 48 hours, or as advised    [employer] last reviewed this educational content on 5/1/2018 2000-2020 [employer]. [address] [place] [address], [place] [address]. All rights reserved. This information is not intended as a substitute for professional medical care. Always follow your healthcare professional's instructions.

## 2020-12-15 DIAGNOSIS — E78.5 HYPERLIPIDEMIA LDL GOAL <130: ICD-10-CM

## 2020-12-15 RX ORDER — ATORVASTATIN CALCIUM 40 MG/1
40 TABLET, FILM COATED ORAL DAILY
Qty: 90 TABLET | Refills: 3 | Status: SHIPPED | OUTPATIENT
Start: 2020-12-15 | End: 2022-03-23

## 2021-03-12 DIAGNOSIS — F41.1 GENERALIZED ANXIETY DISORDER: ICD-10-CM

## 2021-03-15 RX ORDER — QUETIAPINE FUMARATE 100 MG/1
TABLET, FILM COATED ORAL
Qty: 90 TABLET | Refills: 3 | Status: SHIPPED | OUTPATIENT
Start: 2021-03-15 | End: 2022-02-27

## 2021-03-15 NOTE — TELEPHONE ENCOUNTER
Routing refill request to provider for review/approval because:  Labs not current:  Lipids, CBC, A1C      Raiza Hayward RN

## 2021-04-03 ENCOUNTER — HEALTH MAINTENANCE LETTER (OUTPATIENT)
Age: 71
End: 2021-04-03

## 2021-04-06 ENCOUNTER — TELEPHONE (OUTPATIENT)
Dept: FAMILY MEDICINE | Facility: CLINIC | Age: 71
End: 2021-04-06

## 2021-04-06 NOTE — LETTER
[facility] [place]  [address] [facility]  [place] [address]  [phone]  April 6, 2021    John Younger  [address] [facility] [address]    Dear John,    We care about your health and have reviewed your health plan including your medical conditions, medication list, and lab results.  Based on this review, it is recommended that you follow up regarding the following health topic(s):     -Colon Cancer Screening  -Wellness (Physical) Visit     We recommend you take the following action(s):  -schedule a WELLNESS (Physical) APPOINTMENT.  We will perform the following labs: A1c and Lipids (fasting cholesterol - nothing to eat except water and/or meds for 8-10 hours).    Please call us at [phone] (or use [email or web address]) to address the above recommendations.     Thank you for trusting [facility] and we appreciate the opportunity to serve you.  We look forward to supporting your healthcare needs in the future.    Healthy Regards,      Your Health Care Team  [facility]

## 2021-04-06 NOTE — TELEPHONE ENCOUNTER
Panel Management Review        Composite cancer screening  Chart review shows that this patient is due/due soon for the following Colonoscopy  Summary:    Patient is due/failing the following:   COLONOSCOPY and PHYSICAL    Action needed:   Patient needs office visit for physical/med check with fasting labs. Will address need for colonoscopy at visit.    Type of outreach:    Sent letter.    Questions for provider review:    None                                                                                                                                    Awa Ayala CMA

## 2021-04-23 ENCOUNTER — IMMUNIZATION (OUTPATIENT)
Dept: FAMILY MEDICINE | Facility: CLINIC | Age: 71
End: 2021-04-23
Payer: COMMERCIAL

## 2021-04-23 PROCEDURE — 91301 PR COVID VAC MODERNA 100 MCG/0.5 ML IM: CPT

## 2021-04-23 PROCEDURE — 0011A PR COVID VAC MODERNA 100 MCG/0.5 ML IM: CPT

## 2021-05-21 ENCOUNTER — IMMUNIZATION (OUTPATIENT)
Dept: FAMILY MEDICINE | Facility: CLINIC | Age: 71
End: 2021-05-21
Attending: FAMILY MEDICINE
Payer: COMMERCIAL

## 2021-05-21 PROCEDURE — 91301 PR COVID VAC MODERNA 100 MCG/0.5 ML IM: CPT

## 2021-05-21 PROCEDURE — 0012A PR COVID VAC MODERNA 100 MCG/0.5 ML IM: CPT

## 2021-08-01 ENCOUNTER — HOSPITAL ENCOUNTER (EMERGENCY)
Facility: CLINIC | Age: 71
Discharge: HOME | End: 2021-08-01
Attending: PHYSICIAN ASSISTANT | Admitting: PHYSICIAN ASSISTANT
Payer: COMMERCIAL

## 2021-08-01 VITALS
RESPIRATION RATE: 18 BRPM | WEIGHT: 195 LBS | SYSTOLIC BLOOD PRESSURE: 142 MMHG | DIASTOLIC BLOOD PRESSURE: 88 MMHG | TEMPERATURE: 98.2 F | OXYGEN SATURATION: 97 % | HEART RATE: 80 BPM | BODY MASS INDEX: 24.37 KG/M2

## 2021-08-01 DIAGNOSIS — S61.412A LACERATION OF LEFT HAND: ICD-10-CM

## 2021-08-01 PROCEDURE — 90715 TDAP VACCINE 7 YRS/> IM: CPT | Performed by: PHYSICIAN ASSISTANT

## 2021-08-01 PROCEDURE — G0463 HOSPITAL OUTPT CLINIC VISIT: HCPCS | Mod: 25 | Performed by: PHYSICIAN ASSISTANT

## 2021-08-01 PROCEDURE — 250N000011 HC RX IP 250 OP 636: Performed by: PHYSICIAN ASSISTANT

## 2021-08-01 PROCEDURE — 99213 OFFICE O/P EST LOW 20 MIN: CPT | Mod: 25 | Performed by: PHYSICIAN ASSISTANT

## 2021-08-01 PROCEDURE — 90471 IMMUNIZATION ADMIN: CPT | Performed by: PHYSICIAN ASSISTANT

## 2021-08-01 PROCEDURE — 12002 RPR S/N/AX/GEN/TRNK2.6-7.5CM: CPT | Performed by: PHYSICIAN ASSISTANT

## 2021-08-01 RX ADMIN — CLOSTRIDIUM TETANI TOXOID ANTIGEN (FORMALDEHYDE INACTIVATED), CORYNEBACTERIUM DIPHTHERIAE TOXOID ANTIGEN (FORMALDEHYDE INACTIVATED), BORDETELLA PERTUSSIS TOXOID ANTIGEN (GLUTARALDEHYDE INACTIVATED), BORDETELLA PERTUSSIS FILAMENTOUS HEMAGGLUTININ ANTIGEN (FORMALDEHYDE INACTIVATED), BORDETELLA PERTUSSIS PERTACTIN ANTIGEN, AND BORDETELLA PERTUSSIS FIMBRIAE 2/3 ANTIGEN 0.5 ML: 5; 2; 2.5; 5; 3; 5 INJECTION, SUSPENSION INTRAMUSCULAR at 14:41

## 2021-08-01 ASSESSMENT — ENCOUNTER SYMPTOMS
CONSTITUTIONAL NEGATIVE: 1
WOUND: 1
MUSCULOSKELETAL NEGATIVE: 1

## 2021-08-01 NOTE — ED PROVIDER NOTES
"  History     Chief Complaint   Patient presents with     Laceration     left hand/wrist. ?5cm. cut with utility knife. bleeding controlled.      HPI  John Younger is a 71 year old male who presents with complaints of left hand laceration just prior to arrival.  Patient states he accidentally cut his hand while using a utility knife to cut open a bag of sergio litter.  Bleeding is controlled.  He is moving his fingers and thumb without difficulties.  Patient's last tetanus was in 2011.      Allergies:  No Known Allergies    Problem List:    Patient Active Problem List    Diagnosis Date Noted     Advanced directives, counseling/discussion 02/13/2017     Priority: Medium     Advance Care Planning 2/13/2017: Information given             Generalized anxiety disorder 01/05/2016     Priority: Medium     February 5, 2016 recent exacerbation, now improvement with addition of  Effexor XR 75 mg daily, plus Seroquel 100 mg at bedtime, mainly for sleep. Plan: continue current medications, follow up 6 months.        24 hour handout given 01/30/2012     Priority: Medium     EMERGENCY CARE PLAN  Presenting Problem Signs and Symptoms Treatment Plan    Questions or conerns during clinic hours    I will call the clinic directly     Questions or conerns outside clinic hours    I will call the 24 hour nurse line at [phone]    Patient needs to schedule an appointment    I will call the 24 hour scheduling team at [phone] or clinic directly    Same day treatment     I will call the clinic first, nurse line if after hours, urgent care and express care if needed                                 Irregular heart beat 07/30/2011     Priority: Medium     March 8, 2016: Notes \"fluttering\" feeling in heart and mild chest pain (that appeared muscular) that began when Effexor-ER dose increased. EKG normal and symptoms not consistent with CV chest pain. Probable medication reaction, will return to original dose.    February 5, 2016  " doing well, denies any irregular heartbeats.    July 30, 2011 notes couplets on exam. EKG shows sinus rhythm, frequent PAC's. Benign. No further workup needed at this time.          Overweight (BMI 25.0-29.9) 07/30/2011     Priority: Medium     January 3, 2014 Body mass index is 27.62 kg/(m^2).          Elevated hemoglobin A1c 07/25/2011     Priority: Medium     July 30, 2011 6.4. Discussed lifestyle, diet, weight loss, regular exercise. Definitely at risk for diabetes. Repeat A1c in 6 months.    Ashley 3, 2013 now back up to 6.4. Stressed lifestyle. Repeat A1c in 6 months.   January 3, 2014  Improve diet,  A1C 6.0. Repeat in 6 months  February 5, 2016 now 6.1, discussed diet. Recheck 6-12 months.        Hyperlipidemia LDL goal <130 12/11/2008     Priority: Medium     January 3, 2014  risks factors: family history, nonsmoker, male, age. On simvastatin 40 mg daily.  Normal liver function tests. LDL:  165. Will change to atorvastatin 40 mg daily. Recheck lipids in 6-12  months.                 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 Father         bypass surgery, dx at age 60     Hypertension Sister      Hypertension Sister      Gynecology Sister         hysterectomy     Gynecology Sister         hysterectomy     Depression Brother      Hypertension Brother        Social History:  Marital Status:    [2]  Social History     Tobacco Use     Smoking status: Former Smoker     Quit date: 7/15/1983     Years since quittin.0     Smokeless tobacco: Never Used     Tobacco comment: started smoking at age 13 got up to 3 packs a day before quiting   Substance Use Topics     Alcohol use: Yes     Comment: maybe 6 beers a month at the most     Drug use: No        Medications:    aspirin (ASA) 325 MG tablet  atorvastatin (LIPITOR) 40 MG tablet  glucosamine-chondroitin 500-400 MG CAPS per capsule  multivitamin, therapeutic (THERA-VIT) TABS tablet  QUEtiapine (SEROQUEL) 100 MG tablet          Review of Systems   Constitutional: Negative.    Musculoskeletal: Negative.    Skin: Positive for wound.   All other systems reviewed and are negative.      Physical Exam   BP: (!) 142/88  Pulse: 80  Temp: 98.2  F (36.8  C)  Resp: 18  Weight: 88.5 kg (195 lb)  SpO2: 97 %      Physical Exam  Constitutional:       General: He is not in acute distress.     Appearance: Normal appearance. He is well-developed. He is not ill-appearing, toxic-appearing or diaphoretic.   HENT:      Head: Normocephalic and atraumatic.      Nose: Nose normal.   Eyes:      Extraocular Movements: Extraocular movements intact.      Conjunctiva/sclera: Conjunctivae normal.      Pupils: Pupils are equal, round, and reactive to light.   Cardiovascular:      Pulses: Normal pulses.   Pulmonary:      Effort: Pulmonary effort is normal.   Musculoskeletal:         General: Normal range of motion.      Left hand: Laceration present. No swelling, tenderness or bony tenderness. Normal range of motion. Normal strength. Normal sensation. Normal capillary refill. Normal pulse.      Cervical back: Neck supple.      Comments: ~5 cm laceration to palmar left hand.  No tendon involvement.  Full active and passive ROM of finger and thumb and wrist.  Full strength.  Sensation intact.   Skin:     General: Skin is warm and dry.      Capillary Refill: Capillary refill takes less than 2  seconds.   Neurological:      General: No focal deficit present.      Mental Status: He is alert.      Sensory: Sensation is intact.      Motor: Motor function is intact.         ED Course        [facility]    -Laceration Repair    Date/Time: 8/1/2021 2:30 PM  Performed by: Nell Rm PA-C  Authorized by: Nell Rm PA-C       ANESTHESIA (see MAR for exact dosages):     Anesthesia method:  Local infiltration    Local anesthetic:  Lidocaine 1% w/o epi  LACERATION DETAILS     Location:  Hand    Hand location:  L palm    Length (cm):  5    REPAIR TYPE:     Repair type:  Simple      EXPLORATION:     Hemostasis achieved with:  Direct pressure    Wound exploration: wound explored through full range of motion      Contaminated: no      TREATMENT:     Area cleansed with:  Saline    Amount of cleaning:  Standard    SKIN REPAIR     Repair method:  Sutures    Suture size:  4-0    Suture material:  Nylon    Suture technique:  Simple interrupted    Number of sutures:  7    APPROXIMATION     Approximation:  Close    POST-PROCEDURE DETAILS     Dressing:  Antibiotic ointment and bulky dressing      PROCEDURE   Patient Tolerance:  Patient tolerated the procedure well with no immediate complications            No results found for this or any previous visit (from the past 24 hour(s)).    Medications   Tdap (tetanus-diphtheria-acell pertussis) (ADACEL) injection 0.5 mL (0.5 mLs Intramuscular Given 8/1/21 1441)       Assessments & Plan (with Medical Decision Making)     Pt is a 71 year old male who presents with complaints of left hand laceration just prior to arrival.  Patient states he accidentally cut his hand while using a utility knife to cut open a bag of sergio litter.  Bleeding is controlled.  He is moving his fingers and thumb without difficulties.  Patient's last tetanus was in 2011.    Pt is afebrile on arrival.  Exam as above.  Tetanus was updated today.  Laceration was cleaned and  repaired (see above procedure note).  Return precautions were reviewed.  Hand-outs were provided.    Patient wwas instructed to follow-up with PCP in 10 days for suture removal and for continued care and management or sooner if new or worsening symptoms.  He is to return to the ED for persistent and/or worsening symptoms.  Patient expressed understanding of the diagnosis and plan and was discharged home in good condition.    I have reviewed the nursing notes.    I have reviewed the findings, diagnosis, plan and need for follow up with the patient.    Discharge Medication List as of 8/1/2021  3:12 PM          Final diagnoses:   Laceration of left hand       8/1/2021   [facility] EMERGENCY DEPT      Disclaimer:  This note consists of symbols derived from keyboarding, dictation and/or voice recognition software.  As a result, there may be errors in the script that have gone undetected.  Please consider this when interpreting information found in this chart.     Nell Rm PA-C  08/01/21 1285       Nell Rm PA-C  08/01/21 0533

## 2021-09-18 ENCOUNTER — HEALTH MAINTENANCE LETTER (OUTPATIENT)
Age: 71
End: 2021-09-18

## 2022-02-24 DIAGNOSIS — F41.1 GENERALIZED ANXIETY DISORDER: ICD-10-CM

## 2022-02-27 RX ORDER — QUETIAPINE FUMARATE 100 MG/1
TABLET, FILM COATED ORAL
Qty: 90 TABLET | Refills: 3 | Status: SHIPPED | OUTPATIENT
Start: 2022-02-27 | End: 2022-10-24

## 2022-02-27 NOTE — TELEPHONE ENCOUNTER
Routing refill request to provider for review/approval because:  Drug not on the [facility] refill protocol    Antipsychotic Medications Failed 02/24/2022 10:54 AM   Protocol Details  Blood pressure under 140/90 in past 12 months    Lipid panel on file within the past 12 months    CBC on file in past 12 months    A1c or Glucose on file in past 12 months    Recent (6 mo) or future (30 days) visit within the authorizing provider's specialty    Patient is 12 years of age or older

## 2022-03-22 NOTE — PROGRESS NOTES
Assessment & Plan     (M25.511,  G89.29,  M25.512) Chronic pain of both shoulders  (primary encounter diagnosis)  Comment: Probably bilateral rotator cuff injuries.  Current symptoms tolerable.  Reviewed options including physical therapy, orthopedic consultation.  Plan: Patient will defer further evaluation until this fall.    (E78.5) Hyperlipidemia LDL goal <130  Comment: Patient has not been taking statin therapy.  We will hold on lipid panel, restart atorvastatin, and check lipid panel at next visit.  Plan: atorvastatin (LIPITOR) 40 MG tablet        Stressed importance of taking statin therapy to prevent or delay heart disease or stroke.    (F41.1) Generalized anxiety disorder  Comment: Doing well with nighttime Seroquel.  Plan: Continue.    (R73.09) Elevated hemoglobin A1c  Comment:   Lab Results   Component Value Date    A1C 6.0 08/09/2019    A1C 6.2 08/03/2018    A1C 6.1 01/05/2016    A1C 6.2 07/09/2015    A1C 6.1 01/08/2015        Plan: Comprehensive metabolic panel (BMP + Alb, Alk         Phos, ALT, AST, Total. Bili, TP), Hemoglobin         A1c           (Z71.89) Counseling for living will  Comment: Reviewed.  Plan: Honoring Choices Referral      (I49.9) Irregular heart beat  Comment: appears resolved.  Today, clinically appears to be in a sinus rhythm.  Plan: Monitor.    (Z23) High priority for 2019-nCoV vaccine  Plan: COVID-19,PF,MODERNA (18+ Yrs BOOSTER .25mL)      Patient Instructions   Refills on your medications, including the cholesterol, atorvastatin.     Blood tests today.     Covid booster today.     Yearly check up.     Let me know about your shoulders this fall.          Return in about 1 year (around 3/23/2023) for Routine Visit.    Olesya Krishnan MD  [facility] DANIELLE Escobar is a 72 year old who presents for the following health issues     History of Present Illness       Reason for visit:  Shoulder, both hands  Symptom onset:  More than a month  Symptoms  include:  Pain, no strength  Symptom progression:  Worsening  Had these symptoms before:  Yes  Has tried/received treatment for these symptoms:  No  What makes it worse:  No  What makes it better:  RestHe exercises with enough effort to increase his heart rate 60 or more minutes per day.  He exercises with enough effort to increase his heart rate 3 or less days per week. He is missing 4 dose(s) of medications per week.       Also addressed:    Chronic pain of both shoulders  Hyperlipidemia LDL goal <130  Generalized anxiety disorder  Elevated hemoglobin A1c  Counseling for living will  Irregular heart beat  High priority for 2019-nCoV vaccine     Review of Systems   CONSTITUTIONAL: NEGATIVE for fever, chills, change in weight  ENT/MOUTH: NEGATIVE for ear, mouth and throat problems  RESP: NEGATIVE for significant cough or SOB  CV: NEGATIVE for chest pain, palpitations or peripheral edema  MUSCULOSKELETAL: bilateral shoulder pain.   PSYCHIATRIC: NEGATIVE for changes in mood or affect      Objective    BP (!) 155/80 (BP Location: Right arm, Cuff Size: Adult Large)   Pulse 75   Temp 97.6  F (36.4  C) (Tympanic)   Wt 96.2 kg (212 lb)   SpO2 98%   BMI 26.50 kg/m    Body mass index is 26.5 kg/m .  Physical Exam   GENERAL: Healthy, alert and no distress  EYES: Eyes grossly normal to inspection, conjunctivae and sclerae normal  RESP: Lungs clear to auscultation - no rales, rhonchi or wheezes  CV: Regular rate and rhythm, normal S1 S2, no murmur  MS: No gross musculoskeletal defects noted, no edema  NEURO: Normal strength and tone, mentation intact and speech normal  PSYCH: Mentation appears normal, affect normal/bright     Olesya Krishnan MD

## 2022-03-23 ENCOUNTER — OFFICE VISIT (OUTPATIENT)
Dept: FAMILY MEDICINE | Facility: CLINIC | Age: 72
End: 2022-03-23
Payer: COMMERCIAL

## 2022-03-23 VITALS
TEMPERATURE: 97.6 F | HEART RATE: 75 BPM | WEIGHT: 212 LBS | DIASTOLIC BLOOD PRESSURE: 80 MMHG | BODY MASS INDEX: 26.5 KG/M2 | OXYGEN SATURATION: 98 % | SYSTOLIC BLOOD PRESSURE: 138 MMHG

## 2022-03-23 DIAGNOSIS — I49.9 IRREGULAR HEART BEAT: ICD-10-CM

## 2022-03-23 DIAGNOSIS — Z23 HIGH PRIORITY FOR 2019-NCOV VACCINE: ICD-10-CM

## 2022-03-23 DIAGNOSIS — E78.5 HYPERLIPIDEMIA LDL GOAL <130: ICD-10-CM

## 2022-03-23 DIAGNOSIS — M25.511 CHRONIC PAIN OF BOTH SHOULDERS: Primary | ICD-10-CM

## 2022-03-23 DIAGNOSIS — M25.512 CHRONIC PAIN OF BOTH SHOULDERS: Primary | ICD-10-CM

## 2022-03-23 DIAGNOSIS — F41.1 GENERALIZED ANXIETY DISORDER: ICD-10-CM

## 2022-03-23 DIAGNOSIS — Z71.89 COUNSELING FOR LIVING WILL: ICD-10-CM

## 2022-03-23 DIAGNOSIS — R73.09 ELEVATED HEMOGLOBIN A1C: ICD-10-CM

## 2022-03-23 DIAGNOSIS — G89.29 CHRONIC PAIN OF BOTH SHOULDERS: Primary | ICD-10-CM

## 2022-03-23 LAB
ALBUMIN SERPL-MCNC: 3.8 G/DL (ref 3.4–5)
ALP SERPL-CCNC: 50 U/L (ref 40–150)
ALT SERPL W P-5'-P-CCNC: 23 U/L (ref 0–70)
ANION GAP SERPL CALCULATED.3IONS-SCNC: 5 MMOL/L (ref 3–14)
AST SERPL W P-5'-P-CCNC: 21 U/L (ref 0–45)
BILIRUB SERPL-MCNC: 0.5 MG/DL (ref 0.2–1.3)
BUN SERPL-MCNC: 15 MG/DL (ref 7–30)
CALCIUM SERPL-MCNC: 8.2 MG/DL (ref 8.5–10.1)
CHLORIDE BLD-SCNC: 111 MMOL/L (ref 94–109)
CO2 SERPL-SCNC: 24 MMOL/L (ref 20–32)
CREAT SERPL-MCNC: 0.96 MG/DL (ref 0.66–1.25)
GFR SERPL CREATININE-BSD FRML MDRD: 83 ML/MIN/1.73M2
GLUCOSE BLD-MCNC: 112 MG/DL (ref 70–99)
HBA1C MFR BLD: 5.9 % (ref 0–5.6)
POTASSIUM BLD-SCNC: 4.8 MMOL/L (ref 3.4–5.3)
PROT SERPL-MCNC: 6.7 G/DL (ref 6.8–8.8)
SODIUM SERPL-SCNC: 140 MMOL/L (ref 133–144)

## 2022-03-23 PROCEDURE — 80053 COMPREHEN METABOLIC PANEL: CPT | Performed by: FAMILY MEDICINE

## 2022-03-23 PROCEDURE — 83036 HEMOGLOBIN GLYCOSYLATED A1C: CPT | Performed by: FAMILY MEDICINE

## 2022-03-23 PROCEDURE — 0064A COVID-19,PF,MODERNA (18+ YRS BOOSTER .25ML): CPT | Performed by: FAMILY MEDICINE

## 2022-03-23 PROCEDURE — 91306 COVID-19,PF,MODERNA (18+ YRS BOOSTER .25ML): CPT | Performed by: FAMILY MEDICINE

## 2022-03-23 PROCEDURE — 99214 OFFICE O/P EST MOD 30 MIN: CPT | Mod: 25 | Performed by: FAMILY MEDICINE

## 2022-03-23 PROCEDURE — 36415 COLL VENOUS BLD VENIPUNCTURE: CPT | Performed by: FAMILY MEDICINE

## 2022-03-23 RX ORDER — ATORVASTATIN CALCIUM 40 MG/1
40 TABLET, FILM COATED ORAL DAILY
Qty: 90 TABLET | Refills: 3 | Status: SHIPPED | OUTPATIENT
Start: 2022-03-23 | End: 2023-07-15

## 2022-03-23 ASSESSMENT — PAIN SCALES - GENERAL: PAINLEVEL: NO PAIN (0)

## 2022-03-23 NOTE — PATIENT INSTRUCTIONS
Refills on your medications, including the cholesterol, atorvastatin.     Blood tests today.     Covid booster today.     Yearly check up.     Let me know about your shoulders this fall.

## 2022-04-07 ENCOUNTER — TELEPHONE (OUTPATIENT)
Dept: FAMILY MEDICINE | Facility: CLINIC | Age: 72
End: 2022-04-07
Payer: COMMERCIAL

## 2022-04-07 NOTE — TELEPHONE ENCOUNTER
Patient Quality Outreach    Patient is due for the following:   Colonoscopy    NEXT STEPS:   - Schedule wellness visit with fasting labs. Will address need for vaccines at visit.  - Complete colonoscopy    Type of outreach:    Sent [employer] message.      Questions for provider review:    None     Awa Avila, CMA

## 2022-04-24 ENCOUNTER — HEALTH MAINTENANCE LETTER (OUTPATIENT)
Age: 72
End: 2022-04-24

## 2022-08-17 NOTE — TELEPHONE ENCOUNTER
"Requested Prescriptions   Pending Prescriptions Disp Refills     QUEtiapine (SEROQUEL) 100 MG tablet [Pharmacy Med Name: QUETIAPINE FUMARATE 100 MG TAB]  Last Written Prescription Date:  1/25/19  Last Fill Quantity: 90,  # refills: 1   Last office visit: 8/3/2018 with prescribing provider:  kevin   Future Office Visit:     90 tablet 1     Sig: TAKE 1 TABLET BY MOUTH NIGHTLY       Antipsychotic Medications Failed - 7/17/2019  1:29 AM        Failed - CBC on file in past 12 months     Recent Labs   Lab Test 01/05/16  0800   WBC 5.9   RBC 4.95   HGB 15.1   HCT 45.0                    Failed - Recent (6 mo) or future (30 days) visit within the authorizing provider's specialty     Patient had office visit in the last 6 months or has a visit in the next 30 days with authorizing provider or within the authorizing provider's specialty.  See \"Patient Info\" tab in inbasket, or \"Choose Columns\" in Meds & Orders section of the refill encounter.            Passed - Blood pressure under 140/90 in past 12 months     BP Readings from Last 3 Encounters:   02/02/19 119/87   08/03/18 124/84   02/02/18 124/70                 Passed - Patient is 12 years of age or older        Passed - Lipid panel on file within the past 12 months     Recent Labs   Lab Test 08/03/18  0822  01/08/15  0759   CHOL 231*   < > 168   TRIG 81   < > 138   HDL 40   < > 38*   *   < > 102   NHDL 191*   < >  --    VLDL  --   --  28   CHOLHDLRATIO  --   --  4.4    < > = values in this interval not displayed.               Passed - Heart Rate on file within past 12 months     Pulse Readings from Last 3 Encounters:   02/02/19 94   08/03/18 68   02/02/18 64               Passed - A1c or Glucose on file in past 12 months     Recent Labs   Lab Test 08/03/18  0822   *   A1C 6.2*       Please review patients last 3 weights. If a weight gain of >10 lbs exists, you may refill the prescription once after instructing the patient to schedule an appointment " Patient Name: Josefa Steele  YOB: 1956  Admit Date: 8/17/2022  Discharge Date: 8/17/2022  Attending MD: Jeff Camarillo MD  MRN: [id number]     DATE OF PROCEDURE:  8/17/Jeff Camarillo MD    ASSISTANT / ASSISTANT balloon was placed across the GE junction let up to all 3 sizes without resistance encountered or heme evident.  Then, the gastric cavity was decompressed and the endoscope removed.  The neck was checked for crepitus and negative The patient appeared to tolerate the procedure well without complication evident and was taken to recovery satisfactorily.  Total time for moderate sedation was measured at 18 minutes.    PHOTOGRAPH:  Yes.    SPECIMENS:  Yes.    ESTIMATED BLOOD LOSS:  <5 mL.    IMPLANTS:  None.    COMPLICATIONS:  None immediately apparent.    ASSESSMENT:   GE reflux, pouch gastritis, gastric bypass anatomy.    PLAN:  1.  Usual post-procedural monitoring.  2.  Continue PPI as prescribed.  3.  Soft diet, advancing as tolerated.  Follow bariatric dietary precautions.  4.  Discuss specimen findings and progress in GI clinic.           within the next 30 days.    Wt Readings from Last 3 Encounters:   02/02/19 99.8 kg (220 lb)   08/03/18 95.4 kg (210 lb 4 oz)   02/02/18 100.2 kg (221 lb)             Passed - Medication is active on med list

## 2022-11-19 ENCOUNTER — HEALTH MAINTENANCE LETTER (OUTPATIENT)
Age: 72
End: 2022-11-19

## 2023-05-09 ENCOUNTER — APPOINTMENT (OUTPATIENT)
Dept: CT IMAGING | Facility: CLINIC | Age: 73
End: 2023-05-09
Attending: EMERGENCY MEDICINE
Payer: COMMERCIAL

## 2023-05-09 ENCOUNTER — HOSPITAL ENCOUNTER (EMERGENCY)
Facility: CLINIC | Age: 73
Discharge: HOME | End: 2023-05-09
Attending: EMERGENCY MEDICINE | Admitting: EMERGENCY MEDICINE
Payer: COMMERCIAL

## 2023-05-09 ENCOUNTER — APPOINTMENT (OUTPATIENT)
Dept: GENERAL RADIOLOGY | Facility: CLINIC | Age: 73
End: 2023-05-09
Attending: EMERGENCY MEDICINE
Payer: COMMERCIAL

## 2023-05-09 VITALS
WEIGHT: 225 LBS | TEMPERATURE: 97.8 F | HEIGHT: 75 IN | RESPIRATION RATE: 18 BRPM | OXYGEN SATURATION: 95 % | SYSTOLIC BLOOD PRESSURE: 153 MMHG | BODY MASS INDEX: 27.98 KG/M2 | DIASTOLIC BLOOD PRESSURE: 97 MMHG | HEART RATE: 70 BPM
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
## 2023-05-09 DIAGNOSIS — S43.102A ACROMIOCLAVICULAR JOINT SEPARATION, LEFT, INITIAL ENCOUNTER: ICD-10-CM

## 2023-05-09 PROCEDURE — 99284 EMERGENCY DEPT VISIT MOD MDM: CPT | Mod: 25 | Performed by: EMERGENCY MEDICINE

## 2023-05-09 PROCEDURE — 99283 EMERGENCY DEPT VISIT LOW MDM: CPT | Performed by: EMERGENCY MEDICINE

## 2023-05-09 PROCEDURE — 70450 CT HEAD/BRAIN W/O DYE: CPT

## 2023-05-09 PROCEDURE — 73030 X-RAY EXAM OF SHOULDER: CPT | Mod: LT

## 2023-05-09 NOTE — ED PROVIDER NOTES
"     Emergency Department Patient Sign-out       Brief HPI:  This is a 73 year old male signed out to me by Dr. Dougherty .  See initial ED Provider note for details of the presentation.     Presents after a fall onto his left shoulder occurred last night around 730 when he tripped on fencing.            Significant Events prior to my assuming care: \    Exam:   Patient Vitals for the past 24 hrs:   BP Temp Temp src Pulse Resp SpO2 Height Weight   05/09/23 0538 (!) 140/102 97.8  F (36.6  C) Oral 83 18 96 % 1.905 m (6' 3\") 102.1 kg (225 lb)           ED RESULTS:   No results found for this visit on 05/09/23 (from the past 24 hour(s)).    ED MEDICATIONS:   Medications - No data to display    X-ray of the shoulder on my over read is negative at this time no signs of dislocation no fracture pending overread by radiology.CT of the head demonstrates no obvious bleed although slightly widened falx posteriorly awaiting radiology over read.        Impression:    ICD-10-CM    1. Fall, initial encounter  W19.XXXA       2. Acromioclavicular joint separation, left, initial encounter  S43.102A Orthopedic  Referral    use lidocaine 4% patchover the AC joint on for 12 of every 24 hours.  slking for comfort only for first feew days./  follow-up sports med.  tylenol 650 mg every 6 hours. range of motion exercises several times daily and follow-up sports med/..           Plan:    Pending studies include imaging over-read.      We spoke about the results on x-ray.  Emphasized the need for range of motion although could use a sling for neck several days for comfort.  Following up in sports med.  Conservative management recommended.  We discussed that there could also be capsular tears and rotator cuff tears associated but that conservative management initially would be optimal.  He lives near the [facility] and likely that would be a great sports med follow-up location.      MD Ayden Herrmann Scott J, " MD  05/09/23 0756

## 2023-05-09 NOTE — ED PROVIDER NOTES
"  History     Chief Complaint   Patient presents with     Fall     Left shoulder pain     HPI  John Younger is a 73 year old male who presents for evaluation of left shoulder pain.  The patient reports that last evening he was working outside when he tripped over some wiring on the ground and fell, landing onto his left shoulder.  He thinks he did hit his head but denies loss of consciousness.  No headache.  No neck pain.  He is complaining of pain in the left shoulder.  No chest pain or difficulty breathing.  No abdominal pain or pelvic pain.  He had trouble sleeping overnight, could not lay down in bed because of the pain and follow-up with this.  He is having trouble moving the left shoulder.  No weakness in the left arm.    Allergies:  No Known Allergies    Problem List:    Patient Active Problem List    Diagnosis Date Noted     Advanced directives, counseling/discussion 02/13/2017     Priority: Medium     Advance Care Planning 2/13/2017: Information given             Generalized anxiety disorder 01/05/2016     Priority: Medium     February 5, 2016 recent exacerbation, now improvement with addition of  Effexor XR 75 mg daily, plus Seroquel 100 mg at bedtime, mainly for sleep. Plan: continue current medications, follow up 6 months.        24 hour handout given 01/30/2012     Priority: Medium     EMERGENCY CARE PLAN  Presenting Problem Signs and Symptoms Treatment Plan    Questions or conerns during clinic hours    I will call the clinic directly     Questions or conerns outside clinic hours    I will call the 24 hour nurse line at [phone]    Patient needs to schedule an appointment    I will call the 24 hour scheduling team at [phone] or clinic directly    Same day treatment     I will call the clinic first, nurse line if after hours, urgent care and express care if needed                                 Irregular heart beat 07/30/2011     Priority: Medium     March 8, 2016: Notes \"fluttering\" " feeling in heart and mild chest pain (that appeared muscular) that began when Effexor-ER dose increased. EKG normal and symptoms not consistent with CV chest pain. Probable medication reaction, will return to original dose.    February 5, 2016  doing well, denies any irregular heartbeats.    July 30, 2011 notes couplets on exam. EKG shows sinus rhythm, frequent PAC's. Benign. No further workup needed at this time.          Overweight (BMI 25.0-29.9) 07/30/2011     Priority: Medium     January 3, 2014 Body mass index is 27.62 kg/(m^2).          Elevated hemoglobin A1c 07/25/2011     Priority: Medium     July 30, 2011 6.4. Discussed lifestyle, diet, weight loss, regular exercise. Definitely at risk for diabetes. Repeat A1c in 6 months.    Ashley 3, 2013 now back up to 6.4. Stressed lifestyle. Repeat A1c in 6 months.   January 3, 2014  Improve diet,  A1C 6.0. Repeat in 6 months  February 5, 2016 now 6.1, discussed diet. Recheck 6-12 months.        Hyperlipidemia LDL goal <130 12/11/2008     Priority: Medium     January 3, 2014  risks factors: family history, nonsmoker, male, age. On simvastatin 40 mg daily.  Normal liver function tests. LDL:  165. Will change to atorvastatin 40 mg daily. Recheck lipids in 6-12  months.                 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 Father         bypass surgery, dx at  "age 60     Hypertension Sister      Hypertension Sister      Gynecology Sister         hysterectomy     Gynecology Sister         hysterectomy     Depression Brother      Hypertension Brother        Social History:  Marital Status:   [2]  Social History     Tobacco Use     Smoking status: Former     Types: Cigarettes     Quit date: 7/15/1983     Years since quittin.8     Smokeless tobacco: Never     Tobacco comments:     started smoking at age 13 got up to 3 packs a day before quiting   Vaping Use     Vaping status: Never Used   Substance Use Topics     Alcohol use: Yes     Comment: maybe 6 beers a month at the most     Drug use: No        Medications:    aspirin (ASA) 325 MG tablet  atorvastatin (LIPITOR) 40 MG tablet  glucosamine-chondroitin 500-400 MG CAPS per capsule  multivitamin, therapeutic (THERA-VIT) TABS tablet  QUEtiapine (SEROQUEL) 100 MG tablet          Review of Systems    Physical Exam   BP: (!) 140/102  Pulse: 83  Temp: 97.8  F (36.6  C)  Resp: 18  Height: 190.5 cm (6' 3\")  Weight: 102.1 kg (225 lb)  SpO2: 96 %      Physical Exam  BP (!) 141/90   Pulse 79   Temp 97.8  F (36.6  C) (Oral)   Resp 18   Ht 1.905 m (6' 3\")   Wt 102.1 kg (225 lb)   SpO2 95%   BMI 28.12 kg/m     GENERAL: Alert, cooperative, no distress  HEAD: Abrasions to top of scalp.  No tenderness.  EYES: Conjunctivae clear, EOM intact. PERLL, Pupils are 3 mm.  HEENT:  Normal external ears, normal TM's without evidence of hemotympanum.  No nasal discharge or evidence of septal hematoma. No facial instability or asymmetry. No evidence of intraoral trauma.  Intact bite without malalignment.  NECK: Full painless range of motion.  No midline tenderness, no lateral tenderness.  CHEST:  No crepitus or tenderness with AP or lateral compression  CARDIOVASCULAR : Nml rate, distal pulses are normal and symmetric  LUNGS: No respiratory distress.  GI: Soft, ND, NT.   PELVIS: No crepitus or tenderness with AP or lateral " compression  BACK: No step-offs palpated, no tenderness to palpation of thoracic or lumbar spine.  EXTREMITIES: No obvious deformities, tenderness to palpation of the left acromion and coracoid process  NEUROLOGICAL : GCS= 15.  Awake, alert, and oriented x 3.  Cranial nerves II-XII grossly intact. Normal muscle strength and tone, sensation to light touch grossly intact in all extremities.  No focal deficits.   SKIN : Normal color, no jaundice or rash.      ED Course                 Procedures              Critical Care time:  none               Results for orders placed or performed during the hospital encounter of 05/09/23 (from the past 24 hour(s))   XR Shoulder Left 2 Views    Narrative    EXAM: XR SHOULDER LEFT 2 VIEWS  LOCATION: [facility]  DATE/TIME: 5/9/2023 6:04 AM CDT    INDICATION: fall, acromion tenderness  COMPARISON: None.      Impression    IMPRESSION: No acute fracture or dislocation. 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       Medications - No data to display    Assessments & Plan (with Medical Decision Making)   73-year-old male presents for evaluation of left shoulder pain after a fall.  He did hit his head and given his age he is high risk for intracranial injury.  Therefore CT of the head obtained, images interpreted independently, no signs of intracranial hemorrhage or mass.  X-ray of the shoulder was also obtained, images interpreted independently as well as radiology read reviewed, no signs of fracture or dislocation.  The patient is likely safe to discharge home with sling and instructions to return if he has worsening of his symptoms or other concerns, otherwise get rechecked if not better over 1 week.  The patient is signed to Dr. Hensley while he is awaiting the CT head results.    I have reviewed the nursing notes.    I have reviewed the findings, diagnosis, plan and need for follow up with the patient.           Medical Decision Making  The patient's presentation was  of moderate complexity (an acute complicated injury).    The patient's evaluation involved:  ordering and/or review of 2 test(s) in this encounter (see separate area of note for details)  independent interpretation of testing performed by another health professional (see separate area of note for details)    The patient's management necessitated only low risk treatment.        New Prescriptions    No medications on file       Final diagnoses:   Fall, initial encounter   Acromioclavicular joint separation, left, initial encounter       5/9/2023   [facility] EMERGENCY DEPT     Lukas Dougherty MD  05/09/23 0618

## 2023-05-09 NOTE — ED TRIAGE NOTES
Pt presents following a fall last night around 1930. Pt states he tripped over fencing landing on left shoulder.     Triage Assessment     Row Name 05/09/23 0544       Triage Assessment (Adult)    Airway WDL WDL       Respiratory WDL    Respiratory WDL WDL       Skin Circulation/Temperature WDL    Skin Circulation/Temperature WDL WDL       Cardiac WDL    Cardiac WDL WDL       Peripheral/Neurovascular WDL    Peripheral Neurovascular WDL WDL       Cognitive/Neuro/Behavioral WDL    Cognitive/Neuro/Behavioral WDL WDL    Row Name 05/09/23 0523       Triage Assessment (Adult)    Airway WDL WDL       Respiratory WDL    Respiratory WDL WDL       Skin Circulation/Temperature WDL    Skin Circulation/Temperature WDL WDL       Cardiac WDL    Cardiac WDL WDL       Peripheral/Neurovascular WDL    Peripheral Neurovascular WDL WDL       Cognitive/Neuro/Behavioral WDL    Cognitive/Neuro/Behavioral WDL WDL

## 2023-05-09 NOTE — DISCHARGE INSTRUCTIONS
ICD-10-CM    1. Fall, initial encounter  W19.XXXA       2. Acromioclavicular joint separation, left, initial encounter  S43.102A Orthopedic  Referral    use lidocaine 4% patchover the AC joint on for 12 of every 24 hours.  slking for comfort only for first feew days./  follow-up sports med.  tylenol 650 mg every 6 hours. range of motion exercises several times daily and follow-up sports med/..           Wear the shoulder sling for comfort and start doing shoulder circles and early range of motion exercises over the next 2 to 3 days.  Follow-up with orthopedic surgery clinic for a recheck.  Return to the emergency department for worsening pain, difficulty breathing, repeated vomiting, fevers, or other concerns.

## 2023-06-01 ENCOUNTER — HEALTH MAINTENANCE LETTER (OUTPATIENT)
Age: 73
End: 2023-06-01

## 2023-07-14 DIAGNOSIS — E78.5 HYPERLIPIDEMIA LDL GOAL <130: ICD-10-CM

## 2023-07-15 RX ORDER — ATORVASTATIN CALCIUM 40 MG/1
40 TABLET, FILM COATED ORAL DAILY
Qty: 90 TABLET | Refills: 3 | Status: SHIPPED | OUTPATIENT
Start: 2023-07-15 | End: 2023-10-03

## 2023-10-03 ENCOUNTER — VIRTUAL VISIT (OUTPATIENT)
Dept: FAMILY MEDICINE | Facility: CLINIC | Age: 73
End: 2023-10-03
Payer: COMMERCIAL

## 2023-10-03 DIAGNOSIS — E78.5 HYPERLIPIDEMIA LDL GOAL <130: ICD-10-CM

## 2023-10-03 DIAGNOSIS — F41.1 GENERALIZED ANXIETY DISORDER: Primary | ICD-10-CM

## 2023-10-03 PROCEDURE — 99214 OFFICE O/P EST MOD 30 MIN: CPT | Mod: 95 | Performed by: FAMILY MEDICINE

## 2023-10-03 RX ORDER — QUETIAPINE FUMARATE 100 MG/1
200 TABLET, FILM COATED ORAL AT BEDTIME
Qty: 180 TABLET | Refills: 1 | Status: SHIPPED | OUTPATIENT
Start: 2023-10-03 | End: 2024-04-05

## 2023-10-03 RX ORDER — ATORVASTATIN CALCIUM 40 MG/1
40 TABLET, FILM COATED ORAL DAILY
Qty: 90 TABLET | Refills: 3 | Status: SHIPPED | OUTPATIENT
Start: 2023-10-03

## 2023-10-03 NOTE — PATIENT INSTRUCTIONS
Quetiapine refilled today.     Atorvastatin refilled today.   Follow up with lab visit in 2 months to check the cholesterol.     Follow up with myself in 6 months for continued cares.
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
## 2024-04-05 DIAGNOSIS — F41.1 GENERALIZED ANXIETY DISORDER: ICD-10-CM

## 2024-04-05 RX ORDER — QUETIAPINE FUMARATE 100 MG/1
200 TABLET, FILM COATED ORAL AT BEDTIME
Qty: 180 TABLET | Refills: 1 | Status: SHIPPED | OUTPATIENT
Start: 2024-04-05 | End: 2024-10-04

## 2024-04-19 ENCOUNTER — OFFICE VISIT (OUTPATIENT)
Dept: FAMILY MEDICINE | Facility: CLINIC | Age: 74
End: 2024-04-19
Payer: COMMERCIAL

## 2024-04-19 VITALS
TEMPERATURE: 97.8 F | BODY MASS INDEX: 25.8 KG/M2 | SYSTOLIC BLOOD PRESSURE: 130 MMHG | OXYGEN SATURATION: 98 % | HEART RATE: 85 BPM | DIASTOLIC BLOOD PRESSURE: 80 MMHG | WEIGHT: 206.4 LBS

## 2024-04-19 DIAGNOSIS — R73.09 ELEVATED HEMOGLOBIN A1C: ICD-10-CM

## 2024-04-19 DIAGNOSIS — M79.672 BILATERAL FOOT PAIN: ICD-10-CM

## 2024-04-19 DIAGNOSIS — F41.1 GENERALIZED ANXIETY DISORDER: ICD-10-CM

## 2024-04-19 DIAGNOSIS — E78.5 HYPERLIPIDEMIA LDL GOAL <130: ICD-10-CM

## 2024-04-19 DIAGNOSIS — M79.671 BILATERAL FOOT PAIN: ICD-10-CM

## 2024-04-19 DIAGNOSIS — Z00.00 ENCOUNTER FOR SUBSEQUENT ANNUAL WELLNESS VISIT (AWV) IN MEDICARE PATIENT: Primary | ICD-10-CM

## 2024-04-19 LAB
ERYTHROCYTE [DISTWIDTH] IN BLOOD BY AUTOMATED COUNT: 13.7 % (ref 10–15)
HBA1C MFR BLD: 6 % (ref 0–5.6)
HCT VFR BLD AUTO: 44.2 % (ref 40–53)
HGB BLD-MCNC: 14.7 G/DL (ref 13.3–17.7)
MCH RBC QN AUTO: 30.6 PG (ref 26.5–33)
MCHC RBC AUTO-ENTMCNC: 33.3 G/DL (ref 31.5–36.5)
MCV RBC AUTO: 92 FL (ref 78–100)
PLATELET # BLD AUTO: 118 10E3/UL (ref 150–450)
RBC # BLD AUTO: 4.81 10E6/UL (ref 4.4–5.9)
WBC # BLD AUTO: 18.4 10E3/UL (ref 4–11)

## 2024-04-19 PROCEDURE — 80061 LIPID PANEL: CPT | Performed by: FAMILY MEDICINE

## 2024-04-19 PROCEDURE — 85027 COMPLETE CBC AUTOMATED: CPT | Performed by: FAMILY MEDICINE

## 2024-04-19 PROCEDURE — 36415 COLL VENOUS BLD VENIPUNCTURE: CPT | Performed by: FAMILY MEDICINE

## 2024-04-19 PROCEDURE — 80053 COMPREHEN METABOLIC PANEL: CPT | Performed by: FAMILY MEDICINE

## 2024-04-19 PROCEDURE — 99214 OFFICE O/P EST MOD 30 MIN: CPT | Mod: 25 | Performed by: FAMILY MEDICINE

## 2024-04-19 PROCEDURE — 84443 ASSAY THYROID STIM HORMONE: CPT | Performed by: FAMILY MEDICINE

## 2024-04-19 PROCEDURE — 83036 HEMOGLOBIN GLYCOSYLATED A1C: CPT | Performed by: FAMILY MEDICINE

## 2024-04-19 RX ORDER — RESPIRATORY SYNCYTIAL VIRUS VACCINE 120MCG/0.5
0.5 KIT INTRAMUSCULAR ONCE
Qty: 1 EACH | Refills: 0 | Status: CANCELLED | OUTPATIENT
Start: 2024-04-19 | End: 2024-04-19

## 2024-04-19 ASSESSMENT — ANXIETY QUESTIONNAIRES
7. FEELING AFRAID AS IF SOMETHING AWFUL MIGHT HAPPEN: NOT AT ALL
8. IF YOU CHECKED OFF ANY PROBLEMS, HOW DIFFICULT HAVE THESE MADE IT FOR YOU TO DO YOUR WORK, TAKE CARE OF THINGS AT HOME, OR GET ALONG WITH OTHER PEOPLE?: NOT DIFFICULT AT ALL
IF YOU CHECKED OFF ANY PROBLEMS ON THIS QUESTIONNAIRE, HOW DIFFICULT HAVE THESE PROBLEMS MADE IT FOR YOU TO DO YOUR WORK, TAKE CARE OF THINGS AT HOME, OR GET ALONG WITH OTHER PEOPLE: NOT DIFFICULT AT ALL
GAD7 TOTAL SCORE: 2
5. BEING SO RESTLESS THAT IT IS HARD TO SIT STILL: NOT AT ALL
3. WORRYING TOO MUCH ABOUT DIFFERENT THINGS: NOT AT ALL
7. FEELING AFRAID AS IF SOMETHING AWFUL MIGHT HAPPEN: NOT AT ALL
6. BECOMING EASILY ANNOYED OR IRRITABLE: NOT AT ALL
1. FEELING NERVOUS, ANXIOUS, OR ON EDGE: NOT AT ALL
4. TROUBLE RELAXING: MORE THAN HALF THE DAYS
GAD7 TOTAL SCORE: 2
2. NOT BEING ABLE TO STOP OR CONTROL WORRYING: NOT AT ALL
GAD7 TOTAL SCORE: 2

## 2024-04-19 ASSESSMENT — ENCOUNTER SYMPTOMS: NERVOUS/ANXIOUS: 1

## 2024-04-19 ASSESSMENT — PATIENT HEALTH QUESTIONNAIRE - PHQ9
SUM OF ALL RESPONSES TO PHQ QUESTIONS 1-9: 0
SUM OF ALL RESPONSES TO PHQ QUESTIONS 1-9: 0
10. IF YOU CHECKED OFF ANY PROBLEMS, HOW DIFFICULT HAVE THESE PROBLEMS MADE IT FOR YOU TO DO YOUR WORK, TAKE CARE OF THINGS AT HOME, OR GET ALONG WITH OTHER PEOPLE: NOT DIFFICULT AT ALL

## 2024-04-19 NOTE — PATIENT INSTRUCTIONS
Stop by any pharmacy for the shingles shot.     For the foot pain, see the foot doctor (podiatrist). [facility]  will call you to coordinate your care as prescribed by your provider. A representative will call you within 2 business days to help you schedule your appointment, or you may contact the  Representative at: [phone].     Blood tests today.     No change in medications.     Yearly check up.     Good luck with the neighbors.

## 2024-04-19 NOTE — PROGRESS NOTES
"  Assessment & Plan     (Z00.00) Encounter for subsequent annual wellness visit (AWV) in Medicare patient  (primary encounter diagnosis)  Comment:  Reviewed the need for periodic healthcare exams and screenings.   Plan: REVIEW OF HEALTH MAINTENANCE PROTOCOL ORDERS        Advised yearly check ups.     (E78.5) Hyperlipidemia LDL goal <130  Comment: Tolerating high intensity statin therapy well.  Plan: Lipid panel reflex to direct LDL Fasting,         Comprehensive metabolic panel (BMP + Alb, Alk         Phos, ALT, AST, Total. Bili, TP)        Await test results.    (R73.09) Elevated hemoglobin A1c  Comment: Stable, consistent with prediabetes.  Lab Results   Component Value Date    A1C 6.0 04/19/2024    A1C 5.9 03/23/2022    A1C 6.0 08/09/2019    A1C 6.2 08/03/2018    A1C 6.1 01/05/2016    A1C 6.2 07/09/2015    A1C 6.1 01/08/2015      Plan: Hemoglobin A1c        Reviewed lifestyle.  Recheck yearly.    (F41.1) Generalized anxiety disorder  Comment: Appears to doing well with nighttime Seroquel.  Plan: TSH with free T4 reflex, CBC with platelets        Continue.  Await test results.    (M79.671,  M79.672) Bilateral foot pain  Comment: Will refer to podiatry.  Plan: Orthopedic  Referral           Patient Instructions   Stop by any pharmacy for the shingles shot.     For the foot pain, see the foot doctor (podiatrist). [facility]  will call you to coordinate your care as prescribed by your provider. A representative will call you within 2 business days to help you schedule your appointment, or you may contact the  Representative at: [phone].     Blood tests today.     No change in medications.     Yearly check up.     Good luck with the neighbors.        BMI  Estimated body mass index is 25.8 kg/m  as calculated from the following:    Height as of 5/9/23: 1.905 m (6' 3\").    Weight as of this encounter: 93.6 kg (206 lb 6.4 oz).             Subjective   Don is a 74 " year old, presenting for the following health issues:  Recheck Medication and Anxiety        2024     7:18 AM   Additional Questions   Roomed by aric Kaufman    History of Present Illness       Back Pain:  He presents for follow up of back pain. Patient's back pain is a recurring problem.  Location of back pain:  Left middle of back and right shoulder  Description of back pain: fullness  Back pain spreads: right shoulder    Since patient first noticed back pain, pain is: rapidly improving  Does back pain interfere with his job:  No       Hyperlipidemia:  He presents for follow up of hyperlipidemia.   He is not taking medication to lower cholesterol. He is having myalgia or other side effects to statin medications.    He eats 0-1 servings of fruits and vegetables daily.He consumes 1 sweetened beverage(s) daily.He exercises with enough effort to increase his heart rate 9 or less minutes per day.  He exercises with enough effort to increase his heart rate 3 or less days per week. He is missing 7 dose(s) of medications per week.  He is not taking prescribed medications regularly due to remembering to take.       Anxiety   How are you doing with your anxiety since your last visit? Improved   Are you having other symptoms that might be associated with anxiety? No  Have you had a significant life event? No   Are you feeling depressed? No  Do you have any concerns with your use of alcohol or other drugs? No    Social History     Tobacco Use    Smoking status: Former     Current packs/day: 0.00     Types: Cigarettes     Quit date: 7/15/1983     Years since quittin.7    Smokeless tobacco: Never    Tobacco comments:     started smoking at age 13 got up to 3 packs a day before quiting   Vaping Use    Vaping status: Never Used   Substance Use Topics    Alcohol use: Yes     Comment: maybe 6 beers a month at the most    Drug use: No         8/3/2018     8:49 AM 3/20/2020     1:28 PM 2024     7:05 AM   LEOLA-7  SCORE   Total Score   2 (minimal anxiety)   Total Score 3 0 2         11/23/2016     2:20 PM 8/3/2018     8:49 AM 4/19/2024     7:04 AM   PHQ   PHQ-9 Total Score 0 3 0   Q9: Thoughts of better off dead/self-harm past 2 weeks Not at all Not at all Not at all         4/19/2024     7:04 AM   Last PHQ-9   1.  Little interest or pleasure in doing things 0   2.  Feeling down, depressed, or hopeless 0   3.  Trouble falling or staying asleep, or sleeping too much 0   4.  Feeling tired or having little energy 0   5.  Poor appetite or overeating 0   6.  Feeling bad about yourself 0   7.  Trouble concentrating 0   8.  Moving slowly or restless 0   Q9: Thoughts of better off dead/self-harm past 2 weeks 0   PHQ-9 Total Score 0         4/19/2024     7:05 AM   LEOLA-7    1. Feeling nervous, anxious, or on edge 0   2. Not being able to stop or control worrying 0   3. Worrying too much about different things 0   4. Trouble relaxing 2   5. Being so restless that it is hard to sit still 0   6. Becoming easily annoyed or irritable 0   7. Feeling afraid, as if something awful might happen 0   LEOLA-7 Total Score 2   If you checked any problems, how difficult have they made it for you to do your work, take care of things at home, or get along with other people? Not difficult at all           Review of Systems  CONSTITUTIONAL: NEGATIVE for fever, chills, change in weight  ENT/MOUTH: NEGATIVE for ear, mouth and throat problems  RESP: NEGATIVE for significant cough or SOB  CV: NEGATIVE for chest pain, palpitations or peripheral edema  MUSCULOSKELETAL: see above.   PSYCHIATRIC: current symptoms manageable.       Objective    BP (!) 140/76 (BP Location: Right arm, Patient Position: Sitting, Cuff Size: Adult Regular)   Pulse 85   Temp 97.8  F (36.6  C) (Temporal)   Wt 93.6 kg (206 lb 6.4 oz)   SpO2 98%   BMI 25.80 kg/m    Body mass index is 25.8 kg/m .  Physical Exam   GENERAL: Healthy, alert and no distress  EYES: Eyes grossly normal to  inspection, conjunctivae and sclerae normal  RESP: Lungs clear to auscultation - no rales, rhonchi or wheezes  CV: Regular rate and rhythm, normal S1 S2, no murmur  MS: No gross musculoskeletal defects noted, no edema  NEURO: Normal strength and tone, mentation intact and speech normal  PSYCH: Mentation appears normal, affect normal/bright           Signed Electronically by: Olesya Krishnan MD

## 2024-04-19 NOTE — LETTER
April 25, 2024      Shawn Younger  [address] [address] [facility] [address]        Dear ,    We are writing to inform you of your test results.    Your recent blood test show that your cholesterol is up.  Please see enclosed test results.  I would recommend that you take your cholesterol-lowering medication, atorvastatin, every day.     Also, you have normal kidney and liver function.  Lastly, there is no signs of anemia.  Your slightly elevated WBC, the infection fighting cells, slightly low platelets cells is not concerning.     Please continue on your current medications and come back for a follow-up visit in 6 months.     Resulted Orders   CBC with platelets   Result Value Ref Range    WBC Count 18.4 (H) 4.0 - 11.0 10e3/uL    RBC Count 4.81 4.40 - 5.90 10e6/uL    Hemoglobin 14.7 13.3 - 17.7 g/dL    Hematocrit 44.2 40.0 - 53.0 %    MCV 92 78 - 100 fL    MCH 30.6 26.5 - 33.0 pg    MCHC 33.3 31.5 - 36.5 g/dL    RDW 13.7 10.0 - 15.0 %    Platelet Count 118 (L) 150 - 450 10e3/uL   TSH with free T4 reflex   Result Value Ref Range    TSH 1.47 0.30 - 4.20 uIU/mL   Hemoglobin A1c   Result Value Ref Range    Hemoglobin A1C 6.0 (H) 0.0 - 5.6 %      Comment:      Normal <5.7%   Prediabetes 5.7-6.4%    Diabetes 6.5% or higher     Note: Adopted from ADA consensus guidelines.   Comprehensive metabolic panel (BMP + Alb, Alk Phos, ALT, AST, Total. Bili, TP)   Result Value Ref Range    Sodium 138 135 - 145 mmol/L      Comment:      Reference intervals for this test were updated on 09/26/2023 to more accurately reflect our healthy population. There may be differences in the flagging of prior results with similar values performed with this method. Interpretation of those prior results can be made in the context of the updated reference intervals.     Potassium 5.3 3.4 - 5.3 mmol/L    Carbon Dioxide (CO2) 26 22 - 29 mmol/L    Anion Gap 9 7 - 15 mmol/L    Urea Nitrogen 18.2 8.0 - 23.0 mg/dL    Creatinine 1.20 (H)  0.67 - 1.17 mg/dL    GFR Estimate 63 >60 mL/min/1.73m2    Calcium 9.6 8.8 - 10.2 mg/dL    Chloride 103 98 - 107 mmol/L    Glucose 108 (H) 70 - 99 mg/dL    Alkaline Phosphatase 56 40 - 150 U/L      Comment:      Reference intervals for this test were updated on 11/14/2023 to more accurately reflect our healthy population. There may be differences in the flagging of prior results with similar values performed with this method. Interpretation of those prior results can be made in the context of the updated reference intervals.    AST 23 0 - 45 U/L      Comment:      Reference intervals for this test were updated on 6/12/2023 to more accurately reflect our healthy population. There may be differences in the flagging of prior results with similar values performed with this method. Interpretation of those prior results can be made in the context of the updated reference intervals.    ALT 16 0 - 70 U/L      Comment:      Reference intervals for this test were updated on 6/12/2023 to more accurately reflect our healthy population. There may be differences in the flagging of prior results with similar values performed with this method. Interpretation of those prior results can be made in the context of the updated reference intervals.      Protein Total 6.8 6.4 - 8.3 g/dL    Albumin 4.4 3.5 - 5.2 g/dL    Bilirubin Total 0.4 <=1.2 mg/dL   Lipid panel reflex to direct LDL Fasting   Result Value Ref Range    Cholesterol 233 (H) <200 mg/dL    Triglycerides 132 <150 mg/dL    Direct Measure HDL 34 (L) >=40 mg/dL    LDL Cholesterol Calculated 173 (H) <=100 mg/dL    Non HDL Cholesterol 199 (H) <130 mg/dL    Patient Fasting > 8hrs? Yes     Narrative    Cholesterol  Desirable:  <200 mg/dL    Triglycerides  Normal:  Less than 150 mg/dL  Borderline High:  150-199 mg/dL  High:  200-499 mg/dL  Very High:  Greater than or equal to 500 mg/dL    Direct Measure HDL  Female:  Greater than or equal to 50 mg/dL   Male:  Greater than or equal to  40 mg/dL    LDL Cholesterol  Desirable:  <100mg/dL  Above Desirable:  100-129 mg/dL   Borderline High:  130-159 mg/dL   High:  160-189 mg/dL   Very High:  >= 190 mg/dL    Non HDL Cholesterol  Desirable:  130 mg/dL  Above Desirable:  130-159 mg/dL  Borderline High:  160-189 mg/dL  High:  190-219 mg/dL  Very High:  Greater than or equal to 220 mg/dL       If you have any questions or concerns, please call the clinic at the number listed above.       Sincerely,      Olesya Krishnan MD/fp

## 2024-04-20 LAB
ALBUMIN SERPL BCG-MCNC: 4.4 G/DL (ref 3.5–5.2)
ALP SERPL-CCNC: 56 U/L (ref 40–150)
ALT SERPL W P-5'-P-CCNC: 16 U/L (ref 0–70)
ANION GAP SERPL CALCULATED.3IONS-SCNC: 9 MMOL/L (ref 7–15)
AST SERPL W P-5'-P-CCNC: 23 U/L (ref 0–45)
BILIRUB SERPL-MCNC: 0.4 MG/DL
BUN SERPL-MCNC: 18.2 MG/DL (ref 8–23)
CALCIUM SERPL-MCNC: 9.6 MG/DL (ref 8.8–10.2)
CHLORIDE SERPL-SCNC: 103 MMOL/L (ref 98–107)
CHOLEST SERPL-MCNC: 233 MG/DL
CREAT SERPL-MCNC: 1.2 MG/DL (ref 0.67–1.17)
DEPRECATED HCO3 PLAS-SCNC: 26 MMOL/L (ref 22–29)
EGFRCR SERPLBLD CKD-EPI 2021: 63 ML/MIN/1.73M2
FASTING STATUS PATIENT QL REPORTED: YES
GLUCOSE SERPL-MCNC: 108 MG/DL (ref 70–99)
HDLC SERPL-MCNC: 34 MG/DL
LDLC SERPL CALC-MCNC: 173 MG/DL
NONHDLC SERPL-MCNC: 199 MG/DL
POTASSIUM SERPL-SCNC: 5.3 MMOL/L (ref 3.4–5.3)
PROT SERPL-MCNC: 6.8 G/DL (ref 6.4–8.3)
SODIUM SERPL-SCNC: 138 MMOL/L (ref 135–145)
TRIGL SERPL-MCNC: 132 MG/DL
TSH SERPL DL<=0.005 MIU/L-ACNC: 1.47 UIU/ML (ref 0.3–4.2)

## 2024-06-17 PROBLEM — Z71.89 ADVANCED DIRECTIVES, COUNSELING/DISCUSSION: Status: RESOLVED | Noted: 2017-02-13 | Resolved: 2024-06-17

## 2024-10-04 DIAGNOSIS — F41.1 GENERALIZED ANXIETY DISORDER: ICD-10-CM

## 2024-10-04 RX ORDER — QUETIAPINE FUMARATE 100 MG/1
200 TABLET, FILM COATED ORAL AT BEDTIME
Qty: 180 TABLET | Refills: 1 | Status: SHIPPED | OUTPATIENT
Start: 2024-10-04

## 2024-12-11 ENCOUNTER — TELEPHONE (OUTPATIENT)
Dept: FAMILY MEDICINE | Facility: CLINIC | Age: 74
End: 2024-12-11

## 2024-12-11 NOTE — TELEPHONE ENCOUNTER
Patient Quality Outreach    Patient is due for the following:   Physical Annual Wellness Visit      Topic Date Due    Zoster (Shingles) Vaccine (1 of 2) Never done    Flu Vaccine (1) 09/01/2024    COVID-19 Vaccine (4 - 2024-25 season) 09/01/2024       Type of outreach:    Sent [employer] message.    Questions for provider review:    None           Fabiola Cheung MA  Chart routed to Care Team.

## 2025-01-29 NOTE — TELEPHONE ENCOUNTER
Patient Quality Outreach    Type of outreach:    Patient is not due for Annual Wellness visit until  after 4/19/2025    Questions for provider review:    None           aFbiola Cheung MA  Chart routed to Care Team.

## 2025-04-01 ENCOUNTER — PATIENT OUTREACH (OUTPATIENT)
Dept: CARE COORDINATION | Facility: CLINIC | Age: 75
End: 2025-04-01
Payer: COMMERCIAL

## 2025-04-15 ENCOUNTER — PATIENT OUTREACH (OUTPATIENT)
Dept: CARE COORDINATION | Facility: CLINIC | Age: 75
End: 2025-04-15
Payer: COMMERCIAL

## 2025-05-31 ENCOUNTER — HEALTH MAINTENANCE LETTER (OUTPATIENT)
Age: 75
End: 2025-05-31

## 2025-06-20 DIAGNOSIS — F41.1 GENERALIZED ANXIETY DISORDER: ICD-10-CM

## 2025-06-22 RX ORDER — QUETIAPINE FUMARATE 100 MG/1
200 TABLET, FILM COATED ORAL AT BEDTIME
Qty: 180 TABLET | Refills: 0 | Status: SHIPPED | OUTPATIENT
Start: 2025-06-22

## 2025-06-26 ENCOUNTER — VIRTUAL VISIT (OUTPATIENT)
Dept: FAMILY MEDICINE | Facility: CLINIC | Age: 75
End: 2025-06-26
Payer: COMMERCIAL

## 2025-06-26 DIAGNOSIS — F41.1 GENERALIZED ANXIETY DISORDER: Primary | ICD-10-CM

## 2025-06-26 NOTE — PATIENT INSTRUCTIONS
The neighbors.  Sounds like nothing is changed.    Continue on the quetiapine.  Prescription sent to in.    I would recommend an in person visit this fall.  Blood test will be needed.    Please call with any questions or concerns.

## 2025-06-26 NOTE — PROGRESS NOTES
Shawn is a 75 year old who is being evaluated via a billable telephone visit.    What phone number would you like to be contacted at? [phone]  How would you like to obtain your AVS? Arely  Originating Location (pt. Location): Home    Distant Location (provider location):  On-site  Telephone visit completed due to the patient did not have access to video, while the distant provider did.    (F41.1) Generalized anxiety disorder  (primary encounter diagnosis)  Comment: Still struggles with anxiety, issues regarding government control and neighbors.  Overall, no significant changes and the patient is getting by reasonably well.  Still benefits from nighttime Seroquel mainly with sleep, diminished anxiety.  Plan: Continue.  Advise follow-up in 3 months.    Patient Instructions   The neighbors.  Sounds like nothing is changed.    Continue on the quetiapine.  Prescription sent to in.    I would recommend an in person visit this fall.  Blood test will be needed.    Please call with any questions or concerns.         Subjective   Shawn is a 75 year old, presenting for the following health issues:  Recheck Medication      6/26/2025     8:33 AM   Additional Questions   Roomed by Kenya over the phone   Accompanied by lars         6/26/2025     8:33 AM   Patient Reported Additional Medications   Patient reports taking the following new medications none     History of Present Illness       Hyperlipidemia:  He presents for follow up of hyperlipidemia.   He is not taking (taking once in a while not regularly) medication to lower cholesterol. He is not having myalgia or other side effects to statin medications.           Review of Systems  Constitutional, HEENT, cardiovascular, pulmonary, gi and gu systems are negative, except as otherwise noted.      Objective           Vitals:  No vitals were obtained today due to virtual visit.    Physical Exam   General: Alert and no distress //Respiratory: No audible wheeze, cough, or  shortness of breath // Psychiatric:  Appropriate affect, tone, and pace of words            Phone call and documentation duration: 11 minutes  Signed Electronically by: Olesya Krishnan MD